# Patient Record
Sex: FEMALE | Race: WHITE | NOT HISPANIC OR LATINO | Employment: FULL TIME | URBAN - NONMETROPOLITAN AREA
[De-identification: names, ages, dates, MRNs, and addresses within clinical notes are randomized per-mention and may not be internally consistent; named-entity substitution may affect disease eponyms.]

---

## 2017-11-02 ENCOUNTER — OFFICE VISIT (OUTPATIENT)
Dept: FAMILY MEDICINE CLINIC | Facility: CLINIC | Age: 42
End: 2017-11-02

## 2017-11-02 VITALS
HEIGHT: 62 IN | WEIGHT: 149.2 LBS | TEMPERATURE: 97.5 F | SYSTOLIC BLOOD PRESSURE: 132 MMHG | HEART RATE: 96 BPM | DIASTOLIC BLOOD PRESSURE: 80 MMHG | OXYGEN SATURATION: 96 % | BODY MASS INDEX: 27.46 KG/M2

## 2017-11-02 DIAGNOSIS — M25.571 ACUTE RIGHT ANKLE PAIN: Primary | ICD-10-CM

## 2017-11-02 PROCEDURE — 99213 OFFICE O/P EST LOW 20 MIN: CPT | Performed by: FAMILY MEDICINE

## 2017-11-02 NOTE — PROGRESS NOTES
Brad Perez is a 42 y.o. female.     History of Present Illness     Sprain right ankle, pot hole trick or treating    Review of Systems   Constitutional: Negative for chills, fatigue and fever.   HENT: Negative for congestion, ear discharge, ear pain, facial swelling, hearing loss, postnasal drip, rhinorrhea, sinus pressure, sore throat, trouble swallowing and voice change.    Eyes: Negative for discharge, redness and visual disturbance.   Respiratory: Negative for cough, chest tightness, shortness of breath and wheezing.    Cardiovascular: Negative for chest pain and palpitations.   Gastrointestinal: Negative for abdominal pain, blood in stool, constipation, diarrhea, nausea and vomiting.   Endocrine: Negative for polydipsia and polyuria.   Genitourinary: Negative for dysuria, flank pain, hematuria and urgency.   Musculoskeletal: Negative for arthralgias, back pain, joint swelling and myalgias.   Skin: Negative for rash.   Neurological: Negative for dizziness, weakness, numbness and headaches.   Hematological: Negative for adenopathy.   Psychiatric/Behavioral: Negative for confusion and sleep disturbance. The patient is not nervous/anxious.        Objective   Physical Exam   Constitutional: She is oriented to person, place, and time. She appears well-developed and well-nourished.   HENT:   Head: Normocephalic and atraumatic.   Right Ear: External ear normal.   Left Ear: External ear normal.   Nose: Nose normal.   Eyes: Conjunctivae and EOM are normal. Pupils are equal, round, and reactive to light.   Neck: Normal range of motion.   Pulmonary/Chest: Effort normal.   Musculoskeletal: Normal range of motion.   Right lateral prox foot and lateral ankle.  Some mild bruising now greenish/yellow and minimal swelling   Neurological: She is alert and oriented to person, place, and time.   Psychiatric: She has a normal mood and affect. Her behavior is normal. Judgment and thought content normal.    Nursing note and vitals reviewed.      Assessment/Plan   Carol was seen today for ankle pain.    Diagnoses and all orders for this visit:    Acute right ankle pain  -     XR Ankle 3+ View Right  -     XR Foot 3+ View Right      I don't see fracture.   Just be careful  Offered splint.

## 2018-01-08 ENCOUNTER — OFFICE VISIT (OUTPATIENT)
Dept: FAMILY MEDICINE CLINIC | Facility: CLINIC | Age: 43
End: 2018-01-08

## 2018-01-08 VITALS
WEIGHT: 144.8 LBS | DIASTOLIC BLOOD PRESSURE: 80 MMHG | BODY MASS INDEX: 27.34 KG/M2 | OXYGEN SATURATION: 96 % | HEART RATE: 109 BPM | HEIGHT: 61 IN | SYSTOLIC BLOOD PRESSURE: 138 MMHG | TEMPERATURE: 97.6 F

## 2018-01-08 DIAGNOSIS — E11.8 TYPE 2 DIABETES MELLITUS WITH COMPLICATION, WITHOUT LONG-TERM CURRENT USE OF INSULIN (HCC): Primary | ICD-10-CM

## 2018-01-08 DIAGNOSIS — R73.9 HYPERGLYCEMIA: ICD-10-CM

## 2018-01-08 LAB
BILIRUB BLD-MCNC: NEGATIVE MG/DL
CLARITY, POC: ABNORMAL
COLOR UR: YELLOW
GLUCOSE BLDC GLUCOMTR-MCNC: 294 MG/DL (ref 70–130)
GLUCOSE UR STRIP-MCNC: ABNORMAL MG/DL
KETONES UR QL: ABNORMAL
LEUKOCYTE EST, POC: ABNORMAL
NITRITE UR-MCNC: NEGATIVE MG/ML
PH UR: 5 [PH] (ref 5–8)
PROT UR STRIP-MCNC: ABNORMAL MG/DL
RBC # UR STRIP: ABNORMAL /UL
SP GR UR: 1.01 (ref 1–1.03)
UROBILINOGEN UR QL: NORMAL

## 2018-01-08 PROCEDURE — 82962 GLUCOSE BLOOD TEST: CPT | Performed by: FAMILY MEDICINE

## 2018-01-08 PROCEDURE — 81002 URINALYSIS NONAUTO W/O SCOPE: CPT | Performed by: FAMILY MEDICINE

## 2018-01-08 PROCEDURE — 99204 OFFICE O/P NEW MOD 45 MIN: CPT | Performed by: FAMILY MEDICINE

## 2018-01-08 RX ORDER — INSULIN GLARGINE 100 [IU]/ML
10 INJECTION, SOLUTION SUBCUTANEOUS DAILY
Qty: 10 ML | Refills: 0 | Status: SHIPPED | OUTPATIENT
Start: 2018-01-08 | End: 2018-01-08

## 2018-01-08 NOTE — PROGRESS NOTES
Subjective   Carol Perez is a 42 y.o. female.     History of Present Illness     For 2 months, dizziness, cotton mouth, urinating frequently.  Last night fsbs 542.  In office 291 and glucose in urine  Both parents are diabetic and on metformin  Pmh: none  Psh: none  Sh:  No tobacco or etoh.  Worked as cna, ,   Fh:  Both parents diabetic    Review of Systems   Constitutional: Negative for chills, fatigue and fever.   HENT: Negative for congestion, ear discharge, ear pain, facial swelling, hearing loss, postnasal drip, rhinorrhea, sinus pressure, sore throat, trouble swallowing and voice change.    Eyes: Negative for discharge, redness and visual disturbance.   Respiratory: Negative for cough, chest tightness, shortness of breath and wheezing.    Cardiovascular: Negative for chest pain and palpitations.   Gastrointestinal: Negative for abdominal pain, blood in stool, constipation, diarrhea, nausea and vomiting.   Endocrine: Positive for polydipsia and polyuria.   Genitourinary: Positive for urgency. Negative for dysuria, flank pain and hematuria.   Musculoskeletal: Negative for arthralgias, back pain, joint swelling and myalgias.   Skin: Negative for rash.   Neurological: Positive for dizziness and headaches. Negative for weakness and numbness.   Hematological: Negative for adenopathy.   Psychiatric/Behavioral: Negative for confusion and sleep disturbance. The patient is not nervous/anxious.        Objective   Physical Exam   Constitutional: She is oriented to person, place, and time. She appears well-developed and well-nourished.   HENT:   Head: Normocephalic and atraumatic.   Right Ear: External ear normal.   Left Ear: External ear normal.   Nose: Nose normal.   Eyes: Conjunctivae and EOM are normal. Pupils are equal, round, and reactive to light.   Neck: Normal range of motion.   Pulmonary/Chest: Effort normal.   Musculoskeletal: Normal range of motion.   Neurological: She is alert and oriented  to person, place, and time.   Psychiatric: She has a normal mood and affect. Her behavior is normal. Judgment and thought content normal.   Nursing note and vitals reviewed.      Assessment/Plan   Carol was seen today for dizziness, dry mouth and urinary frequency.    Diagnoses and all orders for this visit:    Type 2 diabetes mellitus with complication, without long-term current use of insulin    Hyperglycemia  -     CBC (No Diff)  -     Comprehensive Metabolic Panel  -     Hemoglobin A1c  -     POC Urinalysis Dipstick  -     POC Glucose    Other orders  -     Discontinue: insulin glargine (LANTUS) 100 UNIT/ML injection; Inject 10 Units under the skin Daily. Then use per sliding scale  -     metFORMIN (GLUCOPHAGE) 1000 MG tablet; Take 1 tablet by mouth 2 (Two) Times a Day With Meals.      Start lantus per sliding scale to help glucose toxicity.  Give 10 units tonight.  Disregard note above to discontinue lantus.    Call me with fsbs tomorrow  Start metformin.  Return for bloodwork when less dehydrated  Must get diet sports drink and drink one alternating with water.  Discussed diet.   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    rx for blood glucose machine and test bid for now.

## 2018-01-09 ENCOUNTER — TELEPHONE (OUTPATIENT)
Dept: FAMILY MEDICINE CLINIC | Facility: CLINIC | Age: 43
End: 2018-01-09

## 2018-02-12 ENCOUNTER — OFFICE VISIT (OUTPATIENT)
Dept: FAMILY MEDICINE CLINIC | Facility: CLINIC | Age: 43
End: 2018-02-12

## 2018-02-12 VITALS
BODY MASS INDEX: 27.15 KG/M2 | TEMPERATURE: 98 F | HEART RATE: 111 BPM | SYSTOLIC BLOOD PRESSURE: 122 MMHG | WEIGHT: 143.8 LBS | HEIGHT: 61 IN | OXYGEN SATURATION: 97 % | DIASTOLIC BLOOD PRESSURE: 80 MMHG

## 2018-02-12 DIAGNOSIS — E11.8 TYPE 2 DIABETES MELLITUS WITH COMPLICATION, WITHOUT LONG-TERM CURRENT USE OF INSULIN (HCC): ICD-10-CM

## 2018-02-12 DIAGNOSIS — R35.0 URINARY FREQUENCY: Primary | ICD-10-CM

## 2018-02-12 DIAGNOSIS — R31.9 HEMATURIA, UNSPECIFIED TYPE: Primary | ICD-10-CM

## 2018-02-12 LAB
BILIRUB BLD-MCNC: NEGATIVE MG/DL
CLARITY, POC: ABNORMAL
COLOR UR: YELLOW
GLUCOSE UR STRIP-MCNC: ABNORMAL MG/DL
KETONES UR QL: NEGATIVE
LEUKOCYTE EST, POC: ABNORMAL
NITRITE UR-MCNC: NEGATIVE MG/ML
PH UR: 6 [PH] (ref 5–8)
PROT UR STRIP-MCNC: ABNORMAL MG/DL
RBC # UR STRIP: ABNORMAL /UL
SP GR UR: 1 (ref 1–1.03)
UROBILINOGEN UR QL: NORMAL

## 2018-02-12 PROCEDURE — 87186 SC STD MICRODIL/AGAR DIL: CPT | Performed by: FAMILY MEDICINE

## 2018-02-12 PROCEDURE — 87086 URINE CULTURE/COLONY COUNT: CPT | Performed by: FAMILY MEDICINE

## 2018-02-12 PROCEDURE — 99214 OFFICE O/P EST MOD 30 MIN: CPT | Performed by: FAMILY MEDICINE

## 2018-02-12 PROCEDURE — 81002 URINALYSIS NONAUTO W/O SCOPE: CPT | Performed by: FAMILY MEDICINE

## 2018-02-12 PROCEDURE — 87077 CULTURE AEROBIC IDENTIFY: CPT | Performed by: FAMILY MEDICINE

## 2018-02-12 RX ORDER — INSULIN GLARGINE 100 [IU]/ML
10 INJECTION, SOLUTION SUBCUTANEOUS NIGHTLY
COMMUNITY
End: 2018-02-12 | Stop reason: ALTCHOICE

## 2018-02-12 RX ORDER — FLUCONAZOLE 150 MG/1
TABLET ORAL
Qty: 2 TABLET | Refills: 0 | Status: SHIPPED | OUTPATIENT
Start: 2018-02-12 | End: 2018-03-14

## 2018-02-12 RX ORDER — SULFAMETHOXAZOLE AND TRIMETHOPRIM 800; 160 MG/1; MG/1
1 TABLET ORAL 2 TIMES DAILY
Qty: 14 TABLET | Refills: 0 | Status: SHIPPED | OUTPATIENT
Start: 2018-02-12 | End: 2018-03-14

## 2018-02-12 RX ORDER — INSULIN ASPART 100 [IU]/ML
INJECTION, SUSPENSION SUBCUTANEOUS
Qty: 30 ML | Refills: 0 | Status: SHIPPED | OUTPATIENT
Start: 2018-02-12 | End: 2019-08-28 | Stop reason: SDUPTHER

## 2018-02-12 NOTE — PROGRESS NOTES
Subjective   Carol Perez is a 42 y.o. female.     History of Present Illness     Remembers to use her insulin 4 days per week.  Just busy.  fsbs 290's in am.  Up to 38 units lantus at night  Has vaginal itching and pressure.   Drinking constantly 2 months.  Using insulin 1 month  Feels bladder pressure, like bladder is filling up as she is already urinating.    Review of Systems   Constitutional: Negative for chills, fatigue and fever.   HENT: Negative for congestion, ear discharge, ear pain, facial swelling, hearing loss, postnasal drip, rhinorrhea, sinus pressure, sore throat, trouble swallowing and voice change.    Eyes: Negative for discharge, redness and visual disturbance.   Respiratory: Negative for cough, chest tightness, shortness of breath and wheezing.    Cardiovascular: Negative for chest pain and palpitations.   Gastrointestinal: Negative for abdominal pain, blood in stool, constipation, diarrhea, nausea and vomiting.   Endocrine: Negative for polydipsia and polyuria.   Genitourinary: Positive for frequency. Negative for dysuria, flank pain, hematuria and urgency.   Musculoskeletal: Negative for arthralgias, back pain, joint swelling and myalgias.   Skin: Negative for rash.   Neurological: Negative for dizziness, weakness, numbness and headaches.   Hematological: Negative for adenopathy.   Psychiatric/Behavioral: Negative for confusion and sleep disturbance. The patient is not nervous/anxious.        Objective   Physical Exam   Constitutional: She is oriented to person, place, and time. She appears well-developed and well-nourished.   HENT:   Head: Normocephalic and atraumatic.   Right Ear: External ear normal.   Left Ear: External ear normal.   Nose: Nose normal.   Eyes: Conjunctivae and EOM are normal. Pupils are equal, round, and reactive to light.   Neck: Normal range of motion.   Pulmonary/Chest: Effort normal.   Musculoskeletal: Normal range of motion.   Neurological: She is alert and  oriented to person, place, and time.   Psychiatric: She has a normal mood and affect. Her behavior is normal. Judgment and thought content normal.   Nursing note and vitals reviewed.      Assessment/Plan   Carol was seen today for diabetes.    Diagnoses and all orders for this visit:    Urinary frequency    Type 2 diabetes mellitus with complication, without long-term current use of insulin    Other orders  -     insulin aspart prot-insulin aspart (NOVOLOG MIX 70/30) (70-30) 100 UNIT/ML injection; START 30 UNITS AC BREAKFAST AND 20  UNITS AC SUPPER. I EXPECT SHE WILL NEED 100  UNITS PER DAY.  -     fluconazole (DIFLUCAN) 150 MG tablet; ONE NOW AND ONE IN 3 DAYS.  -     sulfamethoxazole-trimethoprim (BACTRIM DS,SEPTRA DS) 800-160 MG per tablet; Take 1 tablet by mouth 2 (Two) Times a Day.    return weekly.  Start new insulin 70/30 30 units before breakfast and 20 units before supper.  Call if any problems    Covered for both bacterial and yeast    Drink water and no sugar Gatorade.    Explained nothing I can do if she is not compliant with therapy.

## 2018-02-15 LAB
BACTERIA SPEC AEROBE CULT: ABNORMAL
BACTERIA SPEC AEROBE CULT: ABNORMAL

## 2018-03-14 ENCOUNTER — OFFICE VISIT (OUTPATIENT)
Dept: ORTHOPEDIC SURGERY | Facility: CLINIC | Age: 43
End: 2018-03-14

## 2018-03-14 VITALS — BODY MASS INDEX: 27.05 KG/M2 | HEIGHT: 62 IN | WEIGHT: 147 LBS

## 2018-03-14 DIAGNOSIS — S93.401A SPRAIN OF RIGHT ANKLE, UNSPECIFIED LIGAMENT, INITIAL ENCOUNTER: ICD-10-CM

## 2018-03-14 DIAGNOSIS — G89.29 CHRONIC PAIN OF RIGHT ANKLE: Primary | ICD-10-CM

## 2018-03-14 DIAGNOSIS — M25.571 ACUTE RIGHT ANKLE PAIN: Primary | ICD-10-CM

## 2018-03-14 DIAGNOSIS — M25.571 CHRONIC PAIN OF RIGHT ANKLE: Primary | ICD-10-CM

## 2018-03-14 PROCEDURE — 99214 OFFICE O/P EST MOD 30 MIN: CPT | Performed by: NURSE PRACTITIONER

## 2018-03-14 RX ORDER — MELOXICAM 15 MG/1
15 TABLET ORAL DAILY
Qty: 30 TABLET | Refills: 1 | Status: SHIPPED | OUTPATIENT
Start: 2018-03-14 | End: 2018-04-13

## 2018-03-14 NOTE — PROGRESS NOTES
Carol Perez is a 42 y.o. female   Primary provider:  Kieran Valle MD       Chief Complaint   Patient presents with   • Right Ankle - Pain       HISTORY OF PRESENT ILLNESS:    Patient complains of right ankle pain/injury that occurred on 10/31/2017 when she stepped off in a pothole, inverting her right ankle.  Patient was seen by her primary care physician, Dr. Valle, on 11/2/2017 with x-rays done that were negative.  Patient continues to have right ankle pain that worsens with longer periods of weightbearing, standing and walking.  Patient continues to have intermittent swelling of the right ankle with longer periods of weightbearing. She has not tried any treatments for the ankle pain/injury other than intermittent rest of it.  Pain is described as intermittent and mild in severity.  Pain is described as aching and burning in nature with associated popping sensations.  Pain is worse with weightbearing, standing and walking.  Pain improves mildly with rest.      Pain   This is a new problem. The current episode started more than 1 month ago (10/31/2017). The problem occurs intermittently. The problem has been unchanged. Associated symptoms include joint swelling. Associated symptoms comments: Aching, burning, popping. . The symptoms are aggravated by walking and standing. She has tried rest for the symptoms. The treatment provided mild relief.        CONCURRENT MEDICAL HISTORY:    Past Medical History:   Diagnosis Date   • Encounter for insertion of intrauterine contraceptive device      replace mirena      • History of female sterilization    • IUD check up    • Pain in pelvis    • Surgical follow-up care        No Known Allergies      Current Outpatient Prescriptions:   •  insulin aspart prot-insulin aspart (NOVOLOG MIX 70/30) (70-30) 100 UNIT/ML injection, START 30 UNITS AC BREAKFAST AND 20  UNITS AC SUPPER. I EXPECT SHE WILL NEED 100  UNITS PER DAY., Disp: 30 mL, Rfl: 0  •  metFORMIN (GLUCOPHAGE)  "1000 MG tablet, Take 1 tablet by mouth 2 (Two) Times a Day With Meals., Disp: 60 tablet, Rfl: 0  •  meloxicam (MOBIC) 15 MG tablet, Take 1 tablet by mouth Daily for 30 days., Disp: 30 tablet, Rfl: 1    Past Surgical History:   Procedure Laterality Date   • HYSTEROSCOPY TUBAL STERILIZATION  10/22/2014    Essure tubal sterilization precedure.   • INJECTION OF MEDICATION  09/03/2014    Dep Provera   • PAP SMEAR  03/10/2008    neg   • VAGINAL DELIVERY  04/05/2008       Family History   Problem Relation Age of Onset   • Diabetes Mother    • Heart disease Mother    • Rheum arthritis Father    • Breast cancer Neg Hx    • Colon cancer Neg Hx    • Endometrial cancer Neg Hx    • Ovarian cancer Neg Hx        Social History     Social History   • Marital status:      Spouse name: N/A   • Number of children: N/A   • Years of education: N/A     Occupational History   • Not on file.     Social History Main Topics   • Smoking status: Never Smoker   • Smokeless tobacco: Never Used   • Alcohol use No   • Drug use: No   • Sexual activity: Defer     Other Topics Concern   • Not on file     Social History Narrative   • No narrative on file        Review of Systems   Constitutional: Negative.    HENT: Negative.    Eyes: Negative.    Respiratory: Negative.    Cardiovascular: Negative.    Gastrointestinal: Negative.    Endocrine: Negative.    Genitourinary: Negative.    Musculoskeletal: Positive for joint swelling.        Right ankle pain. Right foot pain.    Skin: Negative.    Allergic/Immunologic: Negative.    Neurological: Negative.    Hematological: Negative.    Psychiatric/Behavioral: Negative.        PHYSICAL EXAMINATION:       Ht 156.2 cm (61.5\")   Wt 66.7 kg (147 lb)   BMI 27.33 kg/m²     Physical Exam   Constitutional: She is oriented to person, place, and time. Vital signs are normal. She appears well-developed and well-nourished. She is active and cooperative. She does not appear ill. No distress.   HENT:   Head: " Normocephalic.   Pulmonary/Chest: Effort normal. No respiratory distress.   Abdominal: Soft. She exhibits no distension.   Musculoskeletal: She exhibits tenderness (Right ankle, right foot). She exhibits no edema or deformity.   Neurological: She is alert and oriented to person, place, and time. GCS eye subscore is 4. GCS verbal subscore is 5. GCS motor subscore is 6.   Skin: Skin is warm, dry and intact. Capillary refill takes less than 2 seconds.   Psychiatric: She has a normal mood and affect. Her speech is normal and behavior is normal. Judgment and thought content normal. Cognition and memory are normal.   Nursing note and vitals reviewed.      GAIT:     [x]  Normal  []  Antalgic    Assistive device: [x]  None  []  Walker     []  Crutches  []  Cane     []  Wheelchair  []  Stretcher    Right Ankle Exam   Swelling: none    Tenderness   Right ankle tenderness location: lateral, proximal foot and anterior ankle.        Range of Motion   Dorsiflexion: normal   Plantar flexion: normal   Inversion: abnormal   Eversion: abnormal     Muscle Strength   Dorsiflexion:  4/5  Plantar flexion:  4/5  Other   Erythema: absent  Sensation: normal  Pulse: present     Comments:  No swelling present today. No ecchymosis. No deformity. Mild pain with range of motion, primarily with inversion/eversion.       Left Ankle Exam   Swelling: none    Tenderness   The patient is experiencing no tenderness.     Range of Motion   The patient has normal left ankle ROM.     Muscle Strength   The patient has normal left ankle strength.    Other   Erythema: absent  Sensation: normal  Pulse: present            PROCEDURE:  Right  ankle 3 views     REASON FOR EXAM: fell in [facility], M25.571 Pain in right ankle and  joints of right foot     FINDINGS: . Bony and soft tissue structures of the ankle are  normal. No evidence of fracture-dislocation.        IMPRESSION:  1. Negative ankle.     Electronically signed by:  Smith Cardenas MD  11/3/2017 7:51 AM  CDT  Workstation: [id number]    Xr Ankle 3+ View Right    Result Date: 3/14/2018  Narrative: 3 views of the right ankle reveal no evidence of fracture.  Ankle mortise is intact.  No soft tissue swelling is appreciated.  There are no significant changes when compared with previous images from 11/2/2017.  No acute radiologic abnormalities are noted at this time.03/14/18 at 1:15 PM by TRACI Barnhart         ASSESSMENT:    Diagnoses and all orders for this visit:    Chronic pain of right ankle  -     MRI Ankle Right Without Contrast; Future    Sprain of right ankle, unspecified ligament, initial encounter  -     MRI Ankle Right Without Contrast; Future    Other orders  -     meloxicam (MOBIC) 15 MG tablet; Take 1 tablet by mouth Daily for 30 days.    PLAN    X-rays of right ankle reviewed and compared with previous images from 11/2/2017.  Patient has continued to have right ankle pain, especially with weightbearing, and intermittent swelling of the right ankle since her initial injury occurred on 10/31/2017.  Recommend MRI of right ankle to evaluate for tendon and/or ligament injury/rupture.  Patient has not worn any brace to the right ankle since the initial injury.  Recommend ASO ankle brace for support/stability.  Recommend modified activity and modified weight-bearing as tolerated and based on her pain.  Patient has continued to work as a CNA and not been able to modify her activity or weightbearing much since the time of the injury.  Patient does report increased pain and swelling with longer periods of weightbearing, standing and walking.  Recommend intermittent elevation of the right ankle above the heart to minimize swelling.  Recommend ice therapy intermittently as needed for pain/swelling.  Recommend consistent dosing of oral NSAIDs for pain.  Meloxicam is prescribed today for daily dosing.  Follow-up after MRI.    Return after MRI.      This document has been electronically signed by TRACI Barnhart on  March 14, 2018 1:46 PM      TRACI Barnhart

## 2018-03-20 ENCOUNTER — HOSPITAL ENCOUNTER (OUTPATIENT)
Dept: MRI IMAGING | Facility: HOSPITAL | Age: 43
Discharge: HOME | End: 2018-03-20
Admitting: NURSE PRACTITIONER

## 2018-03-20 DIAGNOSIS — S93.401A SPRAIN OF RIGHT ANKLE, UNSPECIFIED LIGAMENT, INITIAL ENCOUNTER: ICD-10-CM

## 2018-03-20 DIAGNOSIS — M25.571 CHRONIC PAIN OF RIGHT ANKLE: ICD-10-CM

## 2018-03-20 DIAGNOSIS — G89.29 CHRONIC PAIN OF RIGHT ANKLE: ICD-10-CM

## 2018-03-20 PROCEDURE — 73721 MRI JNT OF LWR EXTRE W/O DYE: CPT

## 2018-03-22 ENCOUNTER — OFFICE VISIT (OUTPATIENT)
Dept: ORTHOPEDIC SURGERY | Facility: CLINIC | Age: 43
End: 2018-03-22

## 2018-03-22 VITALS — BODY MASS INDEX: 27.42 KG/M2 | HEIGHT: 62 IN | WEIGHT: 149 LBS

## 2018-03-22 DIAGNOSIS — G89.29 CHRONIC PAIN OF RIGHT ANKLE: Primary | ICD-10-CM

## 2018-03-22 DIAGNOSIS — S93.401D SPRAIN OF RIGHT ANKLE, UNSPECIFIED LIGAMENT, SUBSEQUENT ENCOUNTER: ICD-10-CM

## 2018-03-22 DIAGNOSIS — M25.571 CHRONIC PAIN OF RIGHT ANKLE: Primary | ICD-10-CM

## 2018-03-22 PROCEDURE — 99213 OFFICE O/P EST LOW 20 MIN: CPT | Performed by: NURSE PRACTITIONER

## 2018-03-22 NOTE — PROGRESS NOTES
Carol Perez is a 42 y.o. female returns for     Chief Complaint   Patient presents with   • Right Ankle - Follow-up   • Results     mri right ankle       HISTORY OF PRESENT ILLNESS: Patient presents to office for follow up of right ankle pain and MRI results of right ankle. Initial injury occurred on 10/31/2017 when she stepped off in a pothole, inverting her ankle. Patient continues to have chronic right ankle pain that worsens with longer periods of standing and walking. Patient was fitted with an ankle brace at last office visit to wear for support with weightbearing. She is not wearing it in office today and states she doesn't think it helps much. Patient was also prescribed Meloxicam. Patient states this is helping her pain some.      CONCURRENT MEDICAL HISTORY:    Past Medical History:   Diagnosis Date   • Encounter for insertion of intrauterine contraceptive device      replace mirena      • History of female sterilization    • IUD check up    • Pain in pelvis    • Surgical follow-up care        No Known Allergies      Current Outpatient Prescriptions:   •  insulin aspart prot-insulin aspart (NOVOLOG MIX 70/30) (70-30) 100 UNIT/ML injection, START 30 UNITS AC BREAKFAST AND 20  UNITS AC SUPPER. I EXPECT SHE WILL NEED 100  UNITS PER DAY., Disp: 30 mL, Rfl: 0  •  meloxicam (MOBIC) 15 MG tablet, Take 1 tablet by mouth Daily for 30 days., Disp: 30 tablet, Rfl: 1  •  metFORMIN (GLUCOPHAGE) 1000 MG tablet, Take 1 tablet by mouth 2 (Two) Times a Day With Meals., Disp: 60 tablet, Rfl: 0    Past Surgical History:   Procedure Laterality Date   • HYSTEROSCOPY TUBAL STERILIZATION  10/22/2014    Essure tubal sterilization precedure.   • INJECTION OF MEDICATION  09/03/2014    Dep Provera   • PAP SMEAR  03/10/2008    neg   • VAGINAL DELIVERY  04/05/2008       ROS  No fevers or chills.  No chest pain or shortness of air.  No GI or  disturbances. Right ankle pain.     PHYSICAL EXAMINATION:       Ht 156.2 cm  "(61.5\")   Wt 67.6 kg (149 lb)   BMI 27.70 kg/m²     Physical Exam   Constitutional: She is oriented to person, place, and time. Vital signs are normal. She appears well-developed and well-nourished. She is active. She does not appear ill. No distress.   HENT:   Head: Normocephalic.   Pulmonary/Chest: Effort normal. No respiratory distress.   Abdominal: Soft. She exhibits no distension.   Neurological: She is alert and oriented to person, place, and time. GCS eye subscore is 4. GCS verbal subscore is 5. GCS motor subscore is 6.   Skin: Skin is warm, dry and intact. Capillary refill takes less than 2 seconds.   Psychiatric: She has a normal mood and affect. Her speech is normal and behavior is normal. Judgment and thought content normal. Cognition and memory are normal.   Vitals reviewed.      GAIT:     []  Normal  [x]  Antalgic    Assistive device: [x]  None  []  Walker     []  Crutches  []  Cane     []  Wheelchair  []  Stretcher    Right Ankle Exam   Swelling: none    Tenderness   Right ankle tenderness location: lateral, proximal foot and anterior ankle.        Range of Motion   Dorsiflexion: normal   Plantar flexion: normal   Inversion: abnormal   Eversion: abnormal     Muscle Strength   Dorsiflexion:  4/5  Plantar flexion:  4/5  Other   Erythema: absent  Sensation: normal  Pulse: present     Comments:  No swelling. No ecchymosis. No deformity. Mild pain with range of motion, primarily with inversion/eversion.       Left Ankle Exam   Swelling: none    Tenderness   The patient is experiencing no tenderness.     Range of Motion   The patient has normal left ankle ROM.     Other   Erythema: absent  Sensation: normal  Pulse: present            Xr Ankle 3+ View Right    Result Date: 3/14/2018  Narrative: 3 views of the right ankle reveal no evidence of fracture.  Ankle mortise is intact.  No soft tissue swelling is appreciated.  There are no significant changes when compared with previous images from 11/2/2017.  No " acute radiologic abnormalities are noted at this time.03/14/18 at 1:15 PM by TRACI Barnhart     Mri Ankle Right Without Contrast    Result Date: 3/20/2018  Narrative: [facility] [place] Patient Name: BUD LANCASTER ORDERING: NATALIYA SEXTON ATTENDING: REFERRING: NATALIYA SEXTON ----------------------- PROCEDURE: MR right ankle without contrast INDICATION: Pain and swelling, injury, suspect ligament injury/rupture TECHNIQUE: Multiplanar and multisequential MR images of the right ankle were obtained without IV contrast. COMPARISON: Radiographs 11/2/2017 FINDINGS: Ligaments: The anterior and posterior talofibular, anterior and posterior tibiofibular, calcaneofibular, deltoid and visualized Lisfranc ligaments, are intact. Tendons:  The Achilles, flexor, extensor and peroneal tendons are normal in signal and morphology. Osseous structures:  Normal bone marrow signal intensity. No fracture or dislocation.  No osteochondral defect evident. Additional findings:  No joint effusion.  The fat within the sinus tarsi is preserved. The tarsal tunnel and plantar fascia are unremarkable.     Impression: Normal exam. No ligamentous injury evident. Electronically signed by:  Morena Henriquez MD  3/20/2018 8:10 PM CDT Workstation: [id number]        ASSESSMENT:    Diagnoses and all orders for this visit:    Chronic pain of right ankle  -     Ambulatory Referral to Physical Therapy Evaluate and treat    Sprain of right ankle, unspecified ligament, subsequent encounter  -     Ambulatory Referral to Physical Therapy Evaluate and treat    PLAN    MRI of right ankle reviewed and results discussed with patient. Patient was fitted with an ASO ankle brace at last visit but she states it is not helping her much and she is not wearing it today. Encouraged patient to wear the brace, especially with longer periods of weight-bearing, standing and walking, such as when she is at work. Recommend activity modification as tolerated and  based on her pain. Recommend elevation of the right ankle intermittently as needed for swelling. She has no swelling today. Recommend alternating ice and heat therapy as needed for swelling and pain. Recommend physical therapy for strengthening and conditioning of the right ankle. Patient is in agreement with this and wants to do therapy at [facility]. Recommend to continue Meloxicam daily, as previously prescribed. Follow up in 6 weeks for recheck.     Return in about 6 weeks (around 5/3/2018).      This document has been electronically signed by TRACI Barnhart on March 22, 2018 2:14 PM      TRACI Barnhart

## 2019-04-08 ENCOUNTER — TELEPHONE (OUTPATIENT)
Dept: FAMILY MEDICINE CLINIC | Facility: CLINIC | Age: 44
End: 2019-04-08

## 2019-04-08 RX ORDER — FLUCONAZOLE 150 MG/1
TABLET ORAL
Qty: 2 TABLET | Refills: 0 | Status: SHIPPED | OUTPATIENT
Start: 2019-04-08 | End: 2019-09-10

## 2019-08-28 ENCOUNTER — APPOINTMENT (OUTPATIENT)
Dept: LAB | Facility: HOSPITAL | Age: 44
End: 2019-08-28

## 2019-08-28 ENCOUNTER — OFFICE VISIT (OUTPATIENT)
Dept: FAMILY MEDICINE CLINIC | Facility: CLINIC | Age: 44
End: 2019-08-28

## 2019-08-28 VITALS
DIASTOLIC BLOOD PRESSURE: 64 MMHG | HEIGHT: 61 IN | SYSTOLIC BLOOD PRESSURE: 104 MMHG | OXYGEN SATURATION: 100 % | WEIGHT: 134.6 LBS | TEMPERATURE: 98.1 F | BODY MASS INDEX: 25.41 KG/M2 | HEART RATE: 94 BPM

## 2019-08-28 DIAGNOSIS — Z91.199 MEDICAL NON-COMPLIANCE: ICD-10-CM

## 2019-08-28 DIAGNOSIS — E11.41 DIABETIC MONONEUROPATHY ASSOCIATED WITH TYPE 2 DIABETES MELLITUS (HCC): ICD-10-CM

## 2019-08-28 DIAGNOSIS — E11.8 TYPE 2 DIABETES MELLITUS WITH COMPLICATION, WITHOUT LONG-TERM CURRENT USE OF INSULIN (HCC): Primary | ICD-10-CM

## 2019-08-28 PROCEDURE — 80061 LIPID PANEL: CPT | Performed by: FAMILY MEDICINE

## 2019-08-28 PROCEDURE — 80053 COMPREHEN METABOLIC PANEL: CPT | Performed by: FAMILY MEDICINE

## 2019-08-28 PROCEDURE — 99213 OFFICE O/P EST LOW 20 MIN: CPT | Performed by: FAMILY MEDICINE

## 2019-08-28 PROCEDURE — 83721 ASSAY OF BLOOD LIPOPROTEIN: CPT | Performed by: FAMILY MEDICINE

## 2019-08-28 PROCEDURE — 83036 HEMOGLOBIN GLYCOSYLATED A1C: CPT | Performed by: FAMILY MEDICINE

## 2019-08-28 PROCEDURE — 85027 COMPLETE CBC AUTOMATED: CPT | Performed by: FAMILY MEDICINE

## 2019-08-28 PROCEDURE — 84443 ASSAY THYROID STIM HORMONE: CPT | Performed by: FAMILY MEDICINE

## 2019-08-28 RX ORDER — LANCETS 28 GAUGE
1 EACH MISCELLANEOUS 2 TIMES DAILY PRN
Qty: 100 EACH | Refills: 12 | Status: SHIPPED | OUTPATIENT
Start: 2019-08-28 | End: 2020-05-05 | Stop reason: SDUPTHER

## 2019-08-28 RX ORDER — INSULIN ASPART 100 [IU]/ML
INJECTION, SUSPENSION SUBCUTANEOUS
Qty: 30 ML | Refills: 0 | Status: SHIPPED | OUTPATIENT
Start: 2019-08-28 | End: 2019-09-10 | Stop reason: SDUPTHER

## 2019-08-28 RX ORDER — METFORMIN HYDROCHLORIDE 500 MG/1
1000 TABLET, EXTENDED RELEASE ORAL
Qty: 60 TABLET | Refills: 0 | Status: SHIPPED | OUTPATIENT
Start: 2019-08-28 | End: 2019-09-10 | Stop reason: SDUPTHER

## 2019-08-28 NOTE — PROGRESS NOTES
" Subjective   Carol Perez is a 44 y.o. female.     History of Present Illness     Leg cramps  Over a year since seen for diabetes.  She says hasn't had time to see me, its not financial. Hasn't been testing her fsbs.   Still on 30u 70/30 am and 20u 70/30 pm  Metformin caused gi problems, stopped taking.    [place] [employer]  Burning toe up right leg.     Review of Systems   Constitutional: Negative for chills, fatigue and fever.   HENT: Negative for congestion, ear discharge, ear pain, facial swelling, hearing loss, postnasal drip, rhinorrhea, sinus pressure, sore throat, trouble swallowing and voice change.    Eyes: Negative for discharge, redness and visual disturbance.   Respiratory: Negative for cough, chest tightness, shortness of breath and wheezing.    Cardiovascular: Negative for chest pain and palpitations.   Gastrointestinal: Negative for abdominal pain, blood in stool, constipation, diarrhea, nausea and vomiting.   Endocrine: Negative for polydipsia and polyuria.   Genitourinary: Negative for dysuria, flank pain, hematuria and urgency.   Musculoskeletal: Negative for arthralgias, back pain, joint swelling and myalgias.   Skin: Negative for rash.   Neurological: Negative for dizziness, weakness, numbness and headaches.   Hematological: Negative for adenopathy.   Psychiatric/Behavioral: Negative for confusion and sleep disturbance. The patient is not nervous/anxious.            /64 (BP Location: Left arm, Patient Position: Sitting, Cuff Size: Adult)   Pulse 94   Temp 98.1 °F (36.7 °C) (Temporal)   Ht 156.2 cm (61.5\")   Wt 61.1 kg (134 lb 9.6 oz)   SpO2 100%   BMI 25.02 kg/m²       Objective     Physical Exam   Constitutional: She is oriented to person, place, and time. She appears well-developed and well-nourished.   HENT:   Head: Normocephalic and atraumatic.   Right Ear: External ear normal.   Left Ear: External ear normal.   Nose: Nose normal.   Eyes: Conjunctivae and EOM are " normal. Pupils are equal, round, and reactive to light.   Neck: Normal range of motion.   Pulmonary/Chest: Effort normal.   Musculoskeletal: Normal range of motion.   Mild discomfort both greater trochanter bursa areas  Mild discomfort si both si joints.    Neurological: She is alert and oriented to person, place, and time.   Psychiatric: She has a normal mood and affect. Her behavior is normal. Judgment and thought content normal.   Nursing note and vitals reviewed.          PAST MEDICAL HISTORY     Past Medical History:   Diagnosis Date   • Encounter for insertion of intrauterine contraceptive device      replace mirena      • History of female sterilization    • IUD check up    • Pain in pelvis    • Surgical follow-up care       PAST SURGICAL HISTORY     Past Surgical History:   Procedure Laterality Date   • HYSTEROSCOPY TUBAL STERILIZATION  10/22/2014    Essure tubal sterilization precedure.   • INJECTION OF MEDICATION  09/03/2014    Dep Provera   • PAP SMEAR  03/10/2008    neg   • VAGINAL DELIVERY  04/05/2008      SOCIAL HISTORY     Social History     Socioeconomic History   • Marital status:      Spouse name: Not on file   • Number of children: Not on file   • Years of education: Not on file   • Highest education level: Not on file   Tobacco Use   • Smoking status: Never Smoker   • Smokeless tobacco: Never Used   Substance and Sexual Activity   • Alcohol use: No   • Drug use: No   • Sexual activity: Defer      ALLERGIES   Patient has no known allergies.   MEDICATIONS     Current Outpatient Medications   Medication Sig Dispense Refill   • fluconazole (DIFLUCAN) 150 MG tablet One now and one in 3 days. 2 tablet 0   • glucose blood (FREESTYLE TEST STRIPS) test strip 1 each by Other route 2 (Two) Times a Day As Needed (sugar). 100 each 12   • insulin aspart prot-insulin aspart (NOVOLOG MIX 70/30) (70-30) 100 UNIT/ML injection START 30 UNITS AC BREAKFAST AND 20  UNITS AC SUPPER. I EXPECT SHE WILL NEED 100   UNITS PER DAY. 30 mL 0   • Lancets (FREESTYLE) lancets 1 each by Other route 2 (Two) Times a Day As Needed (sugar). 100 each 12   • metFORMIN ER (GLUCOPHAGE-XR) 500 MG 24 hr tablet Take 2 tablets by mouth Daily With Breakfast. 60 tablet 0     No current facility-administered medications for this visit.         The following portions of the patient's history were reviewed and updated as appropriate: allergies, current medications, past family history, past medical history, past social history, past surgical history and problem list.        Assessment/Plan   Carol was seen today for spasms.    Diagnoses and all orders for this visit:    Type 2 diabetes mellitus with complication, without long-term current use of insulin (CMS/[facility])  -     CBC (No Diff)  -     Comprehensive Metabolic Panel  -     TSH  -     Hemoglobin A1c  -     Lipid Panel    Diabetic mononeuropathy associated with type 2 diabetes mellitus (CMS/[facility])    Other orders  -     insulin aspart prot-insulin aspart (NOVOLOG MIX 70/30) (70-30) 100 UNIT/ML injection; START 30 UNITS AC BREAKFAST AND 20  UNITS AC SUPPER. I EXPECT SHE WILL NEED 100  UNITS PER DAY.  -     metFORMIN ER (GLUCOPHAGE-XR) 500 MG 24 hr tablet; Take 2 tablets by mouth Daily With Breakfast.  -     glucose blood (FREESTYLE TEST STRIPS) test strip; 1 each by Other route 2 (Two) Times a Day As Needed (sugar).  -     Lancets (FREESTYLE) lancets; 1 each by Other route 2 (Two) Times a Day As Needed (sugar).        Try metformin er  Return 1 week to go over labs.   Explained neuropathy, didn't explain reason for leg cramps.               No Follow-up on file.                  This document has been electronically signed by Kieran Valle MD on August 28, 2019 4:55 PM

## 2019-08-29 ENCOUNTER — TELEPHONE (OUTPATIENT)
Dept: FAMILY MEDICINE CLINIC | Facility: CLINIC | Age: 44
End: 2019-08-29

## 2019-08-29 LAB
ALBUMIN SERPL-MCNC: 4.7 G/DL (ref 3.5–5.2)
ALBUMIN/GLOB SERPL: 2 G/DL
ALP SERPL-CCNC: 100 U/L (ref 39–117)
ALT SERPL W P-5'-P-CCNC: 61 U/L (ref 1–33)
ANION GAP SERPL CALCULATED.3IONS-SCNC: 12.7 MMOL/L (ref 5–15)
ARTICHOKE IGE QN: 217 MG/DL (ref 0–100)
AST SERPL-CCNC: 54 U/L (ref 1–32)
BILIRUB SERPL-MCNC: <0.2 MG/DL (ref 0.2–1.2)
BUN BLD-MCNC: 7 MG/DL (ref 6–20)
BUN/CREAT SERPL: 10 (ref 7–25)
CALCIUM SPEC-SCNC: 9.3 MG/DL (ref 8.6–10.5)
CHLORIDE SERPL-SCNC: 100 MMOL/L (ref 98–107)
CHOLEST SERPL-MCNC: 274 MG/DL (ref 0–200)
CO2 SERPL-SCNC: 22.3 MMOL/L (ref 22–29)
CREAT BLD-MCNC: 0.7 MG/DL (ref 0.57–1)
DEPRECATED RDW RBC AUTO: 42.9 FL (ref 37–54)
ERYTHROCYTE [DISTWIDTH] IN BLOOD BY AUTOMATED COUNT: 14.1 % (ref 12.3–15.4)
GFR SERPL CREATININE-BSD FRML MDRD: 91 ML/MIN/1.73
GLOBULIN UR ELPH-MCNC: 2.4 GM/DL
GLUCOSE BLD-MCNC: 315 MG/DL (ref 65–99)
HBA1C MFR BLD: 13.27 % (ref 4.8–5.6)
HCT VFR BLD AUTO: 42.9 % (ref 34–46.6)
HDLC SERPL-MCNC: 37 MG/DL (ref 40–60)
HGB BLD-MCNC: 13.7 G/DL (ref 12–15.9)
LDLC SERPL CALC-MCNC: ABNORMAL MG/DL
LDLC/HDLC SERPL: ABNORMAL {RATIO}
MCH RBC QN AUTO: 26.7 PG (ref 26.6–33)
MCHC RBC AUTO-ENTMCNC: 31.9 G/DL (ref 31.5–35.7)
MCV RBC AUTO: 83.5 FL (ref 79–97)
PLATELET # BLD AUTO: 295 10*3/MM3 (ref 140–450)
PMV BLD AUTO: 12.3 FL (ref 6–12)
POTASSIUM BLD-SCNC: 4.2 MMOL/L (ref 3.5–5.2)
PROT SERPL-MCNC: 7.1 G/DL (ref 6–8.5)
RBC # BLD AUTO: 5.14 10*6/MM3 (ref 3.77–5.28)
SODIUM BLD-SCNC: 135 MMOL/L (ref 136–145)
TRIGL SERPL-MCNC: 413 MG/DL (ref 0–150)
TSH SERPL DL<=0.05 MIU/L-ACNC: 2.64 UIU/ML (ref 0.27–4.2)
VLDLC SERPL-MCNC: ABNORMAL MG/DL
WBC NRBC COR # BLD: 4.99 10*3/MM3 (ref 3.4–10.8)

## 2019-08-29 RX ORDER — AMITRIPTYLINE HYDROCHLORIDE 10 MG/1
10-30 TABLET, FILM COATED ORAL NIGHTLY
Qty: 90 TABLET | Refills: 11 | OUTPATIENT
Start: 2019-08-29 | End: 2020-10-19

## 2019-08-30 DIAGNOSIS — R74.8 ELEVATED LIVER ENZYMES: Primary | ICD-10-CM

## 2019-09-10 ENCOUNTER — OFFICE VISIT (OUTPATIENT)
Dept: FAMILY MEDICINE CLINIC | Facility: CLINIC | Age: 44
End: 2019-09-10

## 2019-09-10 VITALS
WEIGHT: 139 LBS | DIASTOLIC BLOOD PRESSURE: 58 MMHG | SYSTOLIC BLOOD PRESSURE: 96 MMHG | OXYGEN SATURATION: 96 % | BODY MASS INDEX: 26.24 KG/M2 | HEIGHT: 61 IN | TEMPERATURE: 97.7 F | HEART RATE: 84 BPM

## 2019-09-10 DIAGNOSIS — Z79.4 TYPE 2 DIABETES MELLITUS WITH HYPERGLYCEMIA, WITH LONG-TERM CURRENT USE OF INSULIN (HCC): Primary | ICD-10-CM

## 2019-09-10 DIAGNOSIS — Z12.31 SCREENING MAMMOGRAM, ENCOUNTER FOR: ICD-10-CM

## 2019-09-10 DIAGNOSIS — E11.65 TYPE 2 DIABETES MELLITUS WITH HYPERGLYCEMIA, WITH LONG-TERM CURRENT USE OF INSULIN (HCC): Primary | ICD-10-CM

## 2019-09-10 PROCEDURE — 99213 OFFICE O/P EST LOW 20 MIN: CPT | Performed by: FAMILY MEDICINE

## 2019-09-10 RX ORDER — INSULIN ASPART 100 [IU]/ML
INJECTION, SUSPENSION SUBCUTANEOUS
Qty: 30 ML | Refills: 2 | Status: SHIPPED | OUTPATIENT
Start: 2019-09-10 | End: 2020-04-29 | Stop reason: SDUPTHER

## 2019-09-10 RX ORDER — METFORMIN HYDROCHLORIDE 500 MG/1
1000 TABLET, EXTENDED RELEASE ORAL
Qty: 60 TABLET | Refills: 2 | Status: SHIPPED | OUTPATIENT
Start: 2019-09-10 | End: 2020-07-29

## 2019-09-10 NOTE — PROGRESS NOTES
" Subjective   Carol Perez is a 44 y.o. female.     History of Present Illness     Follow up diabetes.  hga1c 13.27      Review of Systems   Constitutional: Negative for chills, fatigue and fever.   HENT: Negative for congestion, ear discharge, ear pain, facial swelling, hearing loss, postnasal drip, rhinorrhea, sinus pressure, sore throat, trouble swallowing and voice change.    Eyes: Negative for discharge, redness and visual disturbance.   Respiratory: Negative for cough, chest tightness, shortness of breath and wheezing.    Cardiovascular: Negative for chest pain and palpitations.   Gastrointestinal: Negative for abdominal pain, blood in stool, constipation, diarrhea, nausea and vomiting.   Endocrine: Negative for polydipsia and polyuria.   Genitourinary: Negative for dysuria, flank pain, hematuria and urgency.   Musculoskeletal: Negative for arthralgias, back pain, joint swelling and myalgias.   Skin: Negative for rash.   Neurological: Negative for dizziness, weakness, numbness and headaches.   Hematological: Negative for adenopathy.   Psychiatric/Behavioral: Negative for confusion and sleep disturbance. The patient is not nervous/anxious.            BP 96/58 (BP Location: Left arm, Patient Position: Sitting, Cuff Size: Adult)   Pulse 84   Temp 97.7 °F (36.5 °C) (Temporal)   Ht 156.2 cm (61.5\")   Wt 63 kg (139 lb)   SpO2 96%   BMI 25.84 kg/m²       Objective     Physical Exam   Constitutional: She is oriented to person, place, and time. She appears well-developed and well-nourished.   HENT:   Head: Normocephalic and atraumatic.   Right Ear: External ear normal.   Left Ear: External ear normal.   Nose: Nose normal.   moist   Eyes: Conjunctivae and EOM are normal. Pupils are equal, round, and reactive to light.   Neck: Normal range of motion.   Pulmonary/Chest: Effort normal.   Musculoskeletal: Normal range of motion.   Neurological: She is alert and oriented to person, place, and time. "   Psychiatric: She has a normal mood and affect. Her behavior is normal. Judgment and thought content normal.   Nursing note and vitals reviewed.          PAST MEDICAL HISTORY     Past Medical History:   Diagnosis Date   • Encounter for insertion of intrauterine contraceptive device      replace mirena      • History of female sterilization    • IUD check up    • Pain in pelvis    • Surgical follow-up care       PAST SURGICAL HISTORY     Past Surgical History:   Procedure Laterality Date   • HYSTEROSCOPY TUBAL STERILIZATION  10/22/2014    Essure tubal sterilization precedure.   • INJECTION OF MEDICATION  09/03/2014    Dep Provera   • PAP SMEAR  03/10/2008    neg   • VAGINAL DELIVERY  04/05/2008      SOCIAL HISTORY     Social History     Socioeconomic History   • Marital status:      Spouse name: Not on file   • Number of children: Not on file   • Years of education: Not on file   • Highest education level: Not on file   Tobacco Use   • Smoking status: Never Smoker   • Smokeless tobacco: Never Used   Substance and Sexual Activity   • Alcohol use: No   • Drug use: No   • Sexual activity: Defer      ALLERGIES   Patient has no known allergies.   MEDICATIONS     Current Outpatient Medications   Medication Sig Dispense Refill   • amitriptyline (ELAVIL) 10 MG tablet Take 1-3 tablets by mouth Every Night. 90 tablet 11   • glucose blood (FREESTYLE TEST STRIPS) test strip 1 each by Other route 2 (Two) Times a Day As Needed (sugar). 100 each 12   • insulin aspart prot-insulin aspart (NOVOLOG MIX 70/30) (70-30) 100 UNIT/ML injection START 30 UNITS AC BREAKFAST AND 20  UNITS AC SUPPER. I EXPECT SHE WILL NEED 100  UNITS PER DAY. 30 mL 2   • Lancets (FREESTYLE) lancets 1 each by Other route 2 (Two) Times a Day As Needed (sugar). 100 each 12   • metFORMIN ER (GLUCOPHAGE-XR) 500 MG 24 hr tablet Take 2 tablets by mouth Daily With Breakfast. 60 tablet 2     No current facility-administered medications for this visit.          The following portions of the patient's history were reviewed and updated as appropriate: allergies, current medications, past family history, past medical history, past social history, past surgical history and problem list.        Assessment/Plan   Carol was seen today for diabetes and follow-up.    Diagnoses and all orders for this visit:    Type 2 diabetes mellitus with hyperglycemia, with long-term current use of insulin (CMS/[facility])    Screening mammogram, encounter for  -     Mammo Screening Digital Tomosynthesis Bilateral With CAD    Other orders  -     metFORMIN ER (GLUCOPHAGE-XR) 500 MG 24 hr tablet; Take 2 tablets by mouth Daily With Breakfast.  -     insulin aspart prot-insulin aspart (NOVOLOG MIX 70/30) (70-30) 100 UNIT/ML injection; START 30 UNITS AC BREAKFAST AND 20  UNITS AC SUPPER. I EXPECT SHE WILL NEED 100  UNITS PER DAY.      25 min talk face to face again regarding natural history diabetes, diet, exercise.  Start 30 u am and 20 u pm 70/30 but start increasing am insulin dose by 2 units daily until pm sugar is less than 180, then come back and see me.                  No Follow-up on file.                  This document has been electronically signed by Kieran Valle MD on September 10, 2019 1:04 PM

## 2019-12-23 ENCOUNTER — OFFICE VISIT (OUTPATIENT)
Dept: FAMILY MEDICINE CLINIC | Facility: CLINIC | Age: 44
End: 2019-12-23

## 2019-12-23 VITALS
TEMPERATURE: 96.9 F | OXYGEN SATURATION: 99 % | HEIGHT: 61 IN | SYSTOLIC BLOOD PRESSURE: 110 MMHG | WEIGHT: 138.6 LBS | BODY MASS INDEX: 26.17 KG/M2 | HEART RATE: 93 BPM | DIASTOLIC BLOOD PRESSURE: 68 MMHG

## 2019-12-23 DIAGNOSIS — R30.0 DYSURIA: Primary | ICD-10-CM

## 2019-12-23 LAB
BILIRUB BLD-MCNC: NEGATIVE MG/DL
CLARITY, POC: ABNORMAL
COLOR UR: ABNORMAL
GLUCOSE UR STRIP-MCNC: ABNORMAL MG/DL
KETONES UR QL: NEGATIVE
LEUKOCYTE EST, POC: ABNORMAL
NITRITE UR-MCNC: NEGATIVE MG/ML
PH UR: 6 [PH] (ref 5–8)
PROT UR STRIP-MCNC: ABNORMAL MG/DL
RBC # UR STRIP: ABNORMAL /UL
SP GR UR: 1.03 (ref 1–1.03)
UROBILINOGEN UR QL: NORMAL

## 2019-12-23 PROCEDURE — 81002 URINALYSIS NONAUTO W/O SCOPE: CPT | Performed by: FAMILY MEDICINE

## 2019-12-23 PROCEDURE — 99213 OFFICE O/P EST LOW 20 MIN: CPT | Performed by: FAMILY MEDICINE

## 2019-12-23 RX ORDER — PHENAZOPYRIDINE HYDROCHLORIDE 100 MG/1
100 TABLET, FILM COATED ORAL EVERY 4 HOURS PRN
Qty: 12 TABLET | Refills: 0 | Status: SHIPPED | OUTPATIENT
Start: 2019-12-23 | End: 2020-06-16

## 2019-12-23 NOTE — PROGRESS NOTES
" Brad Perez is a 44 y.o. female.     History of Present Illness     This am, have to urinate badly and it burns.  Hasn't drank or eaten since 10pm last night.   She is on her period  Denies vaginal discharge or odor.    Review of Systems   Constitutional: Negative for chills, fatigue and fever.   HENT: Negative for congestion, ear discharge, ear pain, facial swelling, hearing loss, postnasal drip, rhinorrhea, sinus pressure, sore throat, trouble swallowing and voice change.    Eyes: Negative for discharge, redness and visual disturbance.   Respiratory: Negative for cough, chest tightness, shortness of breath and wheezing.    Cardiovascular: Negative for chest pain and palpitations.   Gastrointestinal: Negative for abdominal pain, blood in stool, constipation, diarrhea, nausea and vomiting.   Endocrine: Negative for polydipsia and polyuria.   Genitourinary: Positive for dysuria and urgency. Negative for flank pain and hematuria.   Musculoskeletal: Negative for arthralgias, back pain, joint swelling and myalgias.   Skin: Negative for rash.   Neurological: Negative for dizziness, weakness, numbness and headaches.   Hematological: Negative for adenopathy.   Psychiatric/Behavioral: Negative for confusion and sleep disturbance. The patient is not nervous/anxious.            /68   Pulse 93   Temp 96.9 °F (36.1 °C) (Temporal)   Ht 154.9 cm (61\")   Wt 62.9 kg (138 lb 9.6 oz)   LMP 12/19/2019 (Approximate)   SpO2 99%   BMI 26.19 kg/m²       Objective     Physical Exam   Constitutional: She is oriented to person, place, and time. She appears well-developed and well-nourished.   HENT:   Head: Normocephalic and atraumatic.   Right Ear: External ear normal.   Left Ear: External ear normal.   Nose: Nose normal.   Eyes: Pupils are equal, round, and reactive to light. Conjunctivae and EOM are normal.   Neck: Normal range of motion.   Pulmonary/Chest: Effort normal.   Musculoskeletal: Normal " range of motion.   Neurological: She is alert and oriented to person, place, and time.   Psychiatric: She has a normal mood and affect. Her behavior is normal. Judgment and thought content normal.   Nursing note and vitals reviewed.          PAST MEDICAL HISTORY     Past Medical History:   Diagnosis Date   • Encounter for insertion of intrauterine contraceptive device      replace mirena      • History of female sterilization    • IUD check up    • Pain in pelvis    • Surgical follow-up care       PAST SURGICAL HISTORY     Past Surgical History:   Procedure Laterality Date   • HYSTEROSCOPY TUBAL STERILIZATION  10/22/2014    Essure tubal sterilization precedure.   • INJECTION OF MEDICATION  09/03/2014    Dep Provera   • PAP SMEAR  03/10/2008    neg   • VAGINAL DELIVERY  04/05/2008      SOCIAL HISTORY     Social History     Socioeconomic History   • Marital status:      Spouse name: Not on file   • Number of children: Not on file   • Years of education: Not on file   • Highest education level: Not on file   Tobacco Use   • Smoking status: Never Smoker   • Smokeless tobacco: Never Used   Substance and Sexual Activity   • Alcohol use: No   • Drug use: No   • Sexual activity: Defer      ALLERGIES   Patient has no known allergies.   MEDICATIONS     Current Outpatient Medications   Medication Sig Dispense Refill   • amitriptyline (ELAVIL) 10 MG tablet Take 1-3 tablets by mouth Every Night. 90 tablet 11   • glucose blood (FREESTYLE TEST STRIPS) test strip 1 each by Other route 2 (Two) Times a Day As Needed (sugar). 100 each 12   • insulin aspart prot-insulin aspart (NOVOLOG MIX 70/30) (70-30) 100 UNIT/ML injection START 30 UNITS AC BREAKFAST AND 20  UNITS AC SUPPER. I EXPECT SHE WILL NEED 100  UNITS PER DAY. 30 mL 2   • Lancets (FREESTYLE) lancets 1 each by Other route 2 (Two) Times a Day As Needed (sugar). 100 each 12   • metFORMIN ER (GLUCOPHAGE-XR) 500 MG 24 hr tablet Take 2 tablets by mouth Daily With Breakfast.  60 tablet 2   • phenazopyridine (PYRIDIUM) 100 MG tablet Take 1 tablet by mouth Every 4 (Four) Hours As Needed for Bladder Spasms. 12 tablet 0     No current facility-administered medications for this visit.         The following portions of the patient's history were reviewed and updated as appropriate: allergies, current medications, past family history, past medical history, past social history, past surgical history and problem list.        Assessment/Plan   Carol was seen today for urinary tract infection.    Diagnoses and all orders for this visit:    Dysuria  -     POCT urinalysis dipstick, manual    Other orders  -     phenazopyridine (PYRIDIUM) 100 MG tablet; Take 1 tablet by mouth Every 4 (Four) Hours As Needed for Bladder Spasms.    urine:  sg 1.030, 3+ blood, 1+ protein, carmella esterase 2+    Drink water until urine is clear.                    No follow-ups on file.                  This document has been electronically signed by Kieran Valle MD on December 23, 2019 2:31 PM

## 2020-04-15 ENCOUNTER — OFFICE VISIT (OUTPATIENT)
Dept: FAMILY MEDICINE CLINIC | Facility: CLINIC | Age: 45
End: 2020-04-15

## 2020-04-15 VITALS
OXYGEN SATURATION: 98 % | BODY MASS INDEX: 26.51 KG/M2 | HEIGHT: 61 IN | WEIGHT: 140.4 LBS | DIASTOLIC BLOOD PRESSURE: 64 MMHG | HEART RATE: 90 BPM | TEMPERATURE: 98 F | SYSTOLIC BLOOD PRESSURE: 100 MMHG

## 2020-04-15 DIAGNOSIS — J02.9 SORE THROAT: ICD-10-CM

## 2020-04-15 DIAGNOSIS — H69.83 DYSFUNCTION OF BOTH EUSTACHIAN TUBES: ICD-10-CM

## 2020-04-15 DIAGNOSIS — J30.2 SEASONAL ALLERGIC RHINITIS, UNSPECIFIED TRIGGER: Primary | ICD-10-CM

## 2020-04-15 PROCEDURE — 99213 OFFICE O/P EST LOW 20 MIN: CPT | Performed by: FAMILY MEDICINE

## 2020-04-15 RX ORDER — FLUTICASONE PROPIONATE 50 MCG
2 SPRAY, SUSPENSION (ML) NASAL DAILY
Qty: 1 BOTTLE | Refills: 11 | Status: SHIPPED | OUTPATIENT
Start: 2020-04-15 | End: 2020-07-29

## 2020-04-15 RX ORDER — METHYLPREDNISOLONE 4 MG/1
TABLET ORAL
Qty: 21 TABLET | Refills: 0 | Status: SHIPPED | OUTPATIENT
Start: 2020-04-15 | End: 2020-06-16

## 2020-04-15 RX ORDER — CETIRIZINE HYDROCHLORIDE 10 MG/1
10 TABLET ORAL DAILY
Qty: 30 TABLET | Refills: 11 | OUTPATIENT
Start: 2020-04-15 | End: 2020-10-19

## 2020-04-15 RX ORDER — PSEUDOEPHEDRINE HCL 30 MG
30 TABLET ORAL EVERY 4 HOURS PRN
Qty: 30 TABLET | Refills: 0 | OUTPATIENT
Start: 2020-04-15 | End: 2020-10-19

## 2020-04-15 NOTE — PROGRESS NOTES
" Brad Perez is a 44 y.o. female.     History of Present Illness     Sore throat and ear ache for 2 days.  Worried has ear infection    Review of Systems   Constitutional: Negative for chills, fatigue and fever.   HENT: Positive for ear pain and sore throat. Negative for congestion, ear discharge, facial swelling, hearing loss, postnasal drip, rhinorrhea, sinus pressure, trouble swallowing and voice change.    Eyes: Negative for discharge, redness and visual disturbance.   Respiratory: Negative for cough, chest tightness, shortness of breath and wheezing.    Cardiovascular: Negative for chest pain and palpitations.   Gastrointestinal: Negative for abdominal pain, blood in stool, constipation, diarrhea, nausea and vomiting.   Endocrine: Negative for polydipsia and polyuria.   Genitourinary: Negative for dysuria, flank pain, hematuria and urgency.   Musculoskeletal: Negative for arthralgias, back pain, joint swelling and myalgias.   Skin: Negative for rash.   Neurological: Negative for dizziness, weakness, numbness and headaches.   Hematological: Negative for adenopathy.   Psychiatric/Behavioral: Negative for confusion and sleep disturbance. The patient is not nervous/anxious.            /64 (BP Location: Left arm, Patient Position: Sitting, Cuff Size: Adult)   Pulse 90   Temp 98 °F (36.7 °C) (Temporal)   Ht 154.9 cm (60.98\")   Wt 63.7 kg (140 lb 6.4 oz)   SpO2 98%   BMI 26.54 kg/m²       Objective     Physical Exam   Constitutional: She is oriented to person, place, and time. She appears well-developed and well-nourished.   HENT:   Head: Normocephalic and atraumatic.   Right Ear: External ear normal.   Left Ear: External ear normal.   Nose: Nose normal.   Mouth/Throat: Oropharynx is clear and moist.   Eyes: Pupils are equal, round, and reactive to light. Conjunctivae and EOM are normal.   Neck: Normal range of motion. Neck supple.   Cardiovascular: Normal rate, regular rhythm and " normal heart sounds. Exam reveals no gallop and no friction rub.   No murmur heard.  Pulmonary/Chest: Effort normal and breath sounds normal.   Abdominal: Soft. Bowel sounds are normal. She exhibits no distension. There is no tenderness. There is no rebound and no guarding.   Musculoskeletal: Normal range of motion. She exhibits no edema or deformity.   Neurological: She is alert and oriented to person, place, and time. No cranial nerve deficit.   Skin: Skin is warm and dry. No rash noted. No erythema.   Psychiatric: She has a normal mood and affect. Her behavior is normal. Judgment and thought content normal.   Nursing note and vitals reviewed.          PAST MEDICAL HISTORY     Past Medical History:   Diagnosis Date   • Encounter for insertion of intrauterine contraceptive device      replace mirena      • History of female sterilization    • IUD check up    • Pain in pelvis    • Surgical follow-up care       PAST SURGICAL HISTORY     Past Surgical History:   Procedure Laterality Date   • HYSTEROSCOPY TUBAL STERILIZATION  10/22/2014    Essure tubal sterilization precedure.   • INJECTION OF MEDICATION  09/03/2014    Dep Provera   • PAP SMEAR  03/10/2008    neg   • VAGINAL DELIVERY  04/05/2008      SOCIAL HISTORY     Social History     Socioeconomic History   • Marital status:      Spouse name: Not on file   • Number of children: Not on file   • Years of education: Not on file   • Highest education level: Not on file   Tobacco Use   • Smoking status: Never Smoker   • Smokeless tobacco: Never Used   Substance and Sexual Activity   • Alcohol use: No   • Drug use: No   • Sexual activity: Defer      ALLERGIES   Patient has no known allergies.   MEDICATIONS     Current Outpatient Medications   Medication Sig Dispense Refill   • amitriptyline (ELAVIL) 10 MG tablet Take 1-3 tablets by mouth Every Night. 90 tablet 11   • glucose blood (FREESTYLE TEST STRIPS) test strip 1 each by Other route 2 (Two) Times a Day As  Needed (sugar). 100 each 12   • insulin aspart prot-insulin aspart (NOVOLOG MIX 70/30) (70-30) 100 UNIT/ML injection START 30 UNITS AC BREAKFAST AND 20  UNITS AC SUPPER. I EXPECT SHE WILL NEED 100  UNITS PER DAY. 30 mL 2   • Lancets (FREESTYLE) lancets 1 each by Other route 2 (Two) Times a Day As Needed (sugar). 100 each 12   • metFORMIN ER (GLUCOPHAGE-XR) 500 MG 24 hr tablet Take 2 tablets by mouth Daily With Breakfast. 60 tablet 2   • cetirizine (zyrTEC) 10 MG tablet Take 1 tablet by mouth Daily. 30 tablet 11   • fluticasone (Flonase) 50 MCG/ACT nasal spray 2 sprays into the nostril(s) as directed by provider Daily. 1 bottle 11   • methylPREDNISolone (MEDROL, CHANDLER,) 4 MG tablet Take as directed on package instructions. 21 tablet 0   • phenazopyridine (PYRIDIUM) 100 MG tablet Take 1 tablet by mouth Every 4 (Four) Hours As Needed for Bladder Spasms. 12 tablet 0   • pseudoephedrine (Sudafed) 30 MG tablet Take 1 tablet by mouth Every 4 (Four) Hours As Needed for Congestion. 30 tablet 0     No current facility-administered medications for this visit.         The following portions of the patient's history were reviewed and updated as appropriate: allergies, current medications, past family history, past medical history, past social history, past surgical history and problem list.        Assessment/Plan   Carol was seen today for sore throat, earache and headache.    Diagnoses and all orders for this visit:    Seasonal allergic rhinitis, unspecified trigger    Dysfunction of both eustachian tubes    Sore throat    Other orders  -     methylPREDNISolone (MEDROL, CHANDLER,) 4 MG tablet; Take as directed on package instructions.  -     pseudoephedrine (Sudafed) 30 MG tablet; Take 1 tablet by mouth Every 4 (Four) Hours As Needed for Congestion.  -     fluticasone (Flonase) 50 MCG/ACT nasal spray; 2 sprays into the nostril(s) as directed by provider Daily.  -     cetirizine (zyrTEC) 10 MG tablet; Take 1 tablet by mouth  Daily.    reassurance, no ear infection.                    No follow-ups on file.                  This document has been electronically signed by Kieran Valle MD on April 15, 2020 13:26

## 2020-04-29 RX ORDER — FLUCONAZOLE 150 MG/1
TABLET ORAL
Qty: 2 TABLET | Refills: 0 | Status: SHIPPED | OUTPATIENT
Start: 2020-04-29 | End: 2020-06-16

## 2020-04-29 RX ORDER — INSULIN ASPART 100 [IU]/ML
INJECTION, SUSPENSION SUBCUTANEOUS
Qty: 30 ML | Refills: 0 | Status: SHIPPED | OUTPATIENT
Start: 2020-04-29 | End: 2020-05-05 | Stop reason: ALTCHOICE

## 2020-04-29 NOTE — TELEPHONE ENCOUNTER
"PT CALLED REQUESTING A REFILL ON  - insulin aspart prot-insulin aspart (NOVOLOG MIX 70/30) (70-30) 100 UNIT/ML injection    PT ALSO STATED SHE NEEDS HER YEAST INFECTION MEDICATION. PT STATED SHE DOESN'T KNOW THE NAME OF THE MEDICATION BUT KNOWS SHE TAKES IT \"THREE TIMES\".    PHARMACY: [facility] [address] [facility] [phone] [facility] [phone]   [phone]    PT'S CALLBACK NUMBER: [phone]  "

## 2020-04-30 ENCOUNTER — TELEPHONE (OUTPATIENT)
Dept: FAMILY MEDICINE CLINIC | Facility: CLINIC | Age: 45
End: 2020-04-30

## 2020-04-30 NOTE — TELEPHONE ENCOUNTER
Patient called in to advise that new prescription needs a prior authorization before she can pick it up. Please contact.

## 2020-05-01 NOTE — TELEPHONE ENCOUNTER
Patient called to check on her PA status for her Insulin. Please call patient back once it has been approved. Phone number is [phone]

## 2020-05-05 ENCOUNTER — OFFICE VISIT (OUTPATIENT)
Dept: FAMILY MEDICINE CLINIC | Facility: CLINIC | Age: 45
End: 2020-05-05

## 2020-05-05 VITALS
HEART RATE: 94 BPM | BODY MASS INDEX: 25.79 KG/M2 | TEMPERATURE: 98.6 F | DIASTOLIC BLOOD PRESSURE: 68 MMHG | SYSTOLIC BLOOD PRESSURE: 100 MMHG | WEIGHT: 136.6 LBS | OXYGEN SATURATION: 98 % | HEIGHT: 61 IN

## 2020-05-05 DIAGNOSIS — E11.65 TYPE 2 DIABETES MELLITUS WITH HYPERGLYCEMIA, WITH LONG-TERM CURRENT USE OF INSULIN (HCC): Primary | ICD-10-CM

## 2020-05-05 DIAGNOSIS — Z79.4 TYPE 2 DIABETES MELLITUS WITH HYPERGLYCEMIA, WITH LONG-TERM CURRENT USE OF INSULIN (HCC): Primary | ICD-10-CM

## 2020-05-05 PROCEDURE — 99213 OFFICE O/P EST LOW 20 MIN: CPT | Performed by: FAMILY MEDICINE

## 2020-05-05 RX ORDER — NAPROXEN SODIUM 220 MG
1 TABLET ORAL 2 TIMES DAILY
Qty: 100 EACH | Refills: 11 | Status: SHIPPED | OUTPATIENT
Start: 2020-05-05

## 2020-05-05 RX ORDER — LANCETS 28 GAUGE
1 EACH MISCELLANEOUS 2 TIMES DAILY PRN
Qty: 100 EACH | Refills: 12 | Status: SHIPPED | OUTPATIENT
Start: 2020-05-05

## 2020-05-05 NOTE — PROGRESS NOTES
" Subjective   Carol Perez is a 44 y.o. female.     History of Present Illness     After refill request for insulin, we found out from pharmacy she has not gotten refilled since 9/2019.   Her last hga1c was 13.27 8/28/2019  She just takes every now and then when she feels it is high.  Currently taking 50 units  When she gives herself a shot and it is in the pm when she does so.  The out of pocket cost to her for insulin is over $800 with her insurance.   Her mother is diabetic  Patient has 4 kids and a grand child.   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  She says this is why she cant take time to manage her diabetes.     Review of Systems   Constitutional: Negative for chills, fatigue and fever.   HENT: Negative for congestion, ear discharge, ear pain, facial swelling, hearing loss, postnasal drip, rhinorrhea, sinus pressure, sore throat, trouble swallowing and voice change.    Eyes: Negative for discharge, redness and visual disturbance.   Respiratory: Negative for cough, chest tightness, shortness of breath and wheezing.    Cardiovascular: Negative for chest pain and palpitations.   Gastrointestinal: Negative for abdominal pain, blood in stool, constipation, diarrhea, nausea and vomiting.   Endocrine: Negative for polydipsia and polyuria.   Genitourinary: Negative for dysuria, flank pain, hematuria and urgency.   Musculoskeletal: Negative for arthralgias, back pain, joint swelling and myalgias.   Skin: Negative for rash.   Neurological: Negative for dizziness, weakness, numbness and headaches.   Hematological: Negative for adenopathy.   Psychiatric/Behavioral: Negative for confusion and sleep disturbance. The patient is not nervous/anxious.            /68 (BP Location: Left arm, Patient Position: Sitting, Cuff Size: Adult)   Pulse 94   Temp 98.6 °F (37 °C) (Temporal)   Ht 154.9 cm (60.98\")   Wt 62 kg (136 lb 9.6 oz)   SpO2 98%   BMI 25.82 kg/m²       Objective "     Physical Exam   Constitutional: She is oriented to person, place, and time. She appears well-developed and well-nourished.   HENT:   Head: Normocephalic and atraumatic.   Right Ear: External ear normal.   Left Ear: External ear normal.   Nose: Nose normal.   Eyes: Pupils are equal, round, and reactive to light. Conjunctivae and EOM are normal.   Neck: Normal range of motion.   Pulmonary/Chest: Effort normal.   Musculoskeletal: Normal range of motion.   Neurological: She is alert and oriented to person, place, and time.   Psychiatric: She has a normal mood and affect. Her behavior is normal. Judgment and thought content normal.   Nursing note and vitals reviewed.          PAST MEDICAL HISTORY     Past Medical History:   Diagnosis Date   • Encounter for insertion of intrauterine contraceptive device      replace mirena      • History of female sterilization    • IUD check up    • Pain in pelvis    • Surgical follow-up care       PAST SURGICAL HISTORY     Past Surgical History:   Procedure Laterality Date   • HYSTEROSCOPY TUBAL STERILIZATION  10/22/2014    Essure tubal sterilization precedure.   • INJECTION OF MEDICATION  09/03/2014    Dep Provera   • PAP SMEAR  03/10/2008    neg   • VAGINAL DELIVERY  04/05/2008      SOCIAL HISTORY     Social History     Socioeconomic History   • Marital status:      Spouse name: Not on file   • Number of children: Not on file   • Years of education: Not on file   • Highest education level: Not on file   Tobacco Use   • Smoking status: Never Smoker   • Smokeless tobacco: Never Used   Substance and Sexual Activity   • Alcohol use: No   • Drug use: No   • Sexual activity: Defer      ALLERGIES   Patient has no known allergies.   MEDICATIONS     Current Outpatient Medications   Medication Sig Dispense Refill   • amitriptyline (ELAVIL) 10 MG tablet Take 1-3 tablets by mouth Every Night. 90 tablet 11   • cetirizine (zyrTEC) 10 MG tablet Take 1 tablet by mouth Daily. 30 tablet 11  "  • fluticasone (Flonase) 50 MCG/ACT nasal spray 2 sprays into the nostril(s) as directed by provider Daily. 1 bottle 11   • glucose blood (FREESTYLE TEST STRIPS) test strip 1 each by Other route 2 (Two) Times a Day As Needed (sugar). 100 each 12   • Lancets (FREESTYLE) lancets 1 each by Other route 2 (Two) Times a Day As Needed (sugar). 100 each 12   • metFORMIN ER (GLUCOPHAGE-XR) 500 MG 24 hr tablet Take 2 tablets by mouth Daily With Breakfast. 60 tablet 2   • pseudoephedrine (Sudafed) 30 MG tablet Take 1 tablet by mouth Every 4 (Four) Hours As Needed for Congestion. 30 tablet 0   • fluconazole (Diflucan) 150 MG tablet One now and in 3 days . 2 tablet 0   • insulin NPH-insulin regular (NovoLIN 70/30) (70-30) 100 UNIT/ML injection Start 30u ac breakfast and 20u ac supper. 20 mL 0   • Insulin Syringe 30G X 5/16\" 1 ML misc 1 each 2 (Two) Times a Day. 100 each 11   • methylPREDNISolone (MEDROL, CHANDLER,) 4 MG tablet Take as directed on package instructions. 21 tablet 0   • phenazopyridine (PYRIDIUM) 100 MG tablet Take 1 tablet by mouth Every 4 (Four) Hours As Needed for Bladder Spasms. 12 tablet 0     No current facility-administered medications for this visit.         The following portions of the patient's history were reviewed and updated as appropriate: allergies, current medications, past family history, past medical history, past social history, past surgical history and problem list.        Assessment/Plan   Carol was seen today for diabetes and med refill.    Diagnoses and all orders for this visit:    Type 2 diabetes mellitus with hyperglycemia, with long-term current use of insulin (CMS/[facility])  -     Ambulatory Referral to Endocrinology    Other orders  -     insulin NPH-insulin regular (NovoLIN 70/30) (70-30) 100 UNIT/ML injection; Start 30u ac breakfast and 20u ac supper.  -     Insulin Syringe 30G X 5/16\" 1 ML misc; 1 each 2 (Two) Times a Day.  -     glucose blood (FREESTYLE TEST STRIPS) test strip; 1 each " by Other route 2 (Two) Times a Day As Needed (sugar).  -     Lancets (FREESTYLE) lancets; 1 each by Other route 2 (Two) Times a Day As Needed (sugar).    I did not recheck hga1c.  im sure not good.    I explained difference between novolin and novolog insulin.  Take novolin 30 min before breakfast and supper not right before or after.     Start with 30u am and 20u pm    Wrist down fsbs bid and bring paper in one week for me to review    I suspect will need endocrinologist help.  Will put in referral.     Strongly encouraged to take care of herself, she is shortening her life allowing diabetes to be so out of control.                 No follow-ups on file.                  This document has been electronically signed by Kieran Valle MD on May 5, 2020 14:28

## 2020-06-16 ENCOUNTER — OFFICE VISIT (OUTPATIENT)
Dept: FAMILY MEDICINE CLINIC | Facility: CLINIC | Age: 45
End: 2020-06-16

## 2020-06-16 VITALS — TEMPERATURE: 98 F

## 2020-06-16 DIAGNOSIS — R53.81 MALAISE: Primary | ICD-10-CM

## 2020-06-16 PROCEDURE — 99212 OFFICE O/P EST SF 10 MIN: CPT | Performed by: FAMILY MEDICINE

## 2020-06-16 NOTE — PROGRESS NOTES
Subjective   Carol Perez is a 45 y.o. female.     History of Present Illness     Telephone note  Last Thursday, felt bad and a temp of 99  Her  did go to work due to being in contact with someone with symptoms of covid.  She says she feels just fine now? Just wondered if anything needed to be done.     Review of Systems   Constitutional:        Malaise on Thursday only           Temp 98 °F (36.7 °C)       Objective     Physical Exam        PAST MEDICAL HISTORY     Past Medical History:   Diagnosis Date   • Encounter for insertion of intrauterine contraceptive device      replace mirena      • History of female sterilization    • IUD check up    • Pain in pelvis    • Surgical follow-up care       PAST SURGICAL HISTORY     Past Surgical History:   Procedure Laterality Date   • HYSTEROSCOPY TUBAL STERILIZATION  10/22/2014    Essure tubal sterilization precedure.   • INJECTION OF MEDICATION  09/03/2014    Dep Provera   • PAP SMEAR  03/10/2008    neg   • VAGINAL DELIVERY  04/05/2008      SOCIAL HISTORY     Social History     Socioeconomic History   • Marital status:      Spouse name: Not on file   • Number of children: Not on file   • Years of education: Not on file   • Highest education level: Not on file   Tobacco Use   • Smoking status: Never Smoker   • Smokeless tobacco: Never Used   Substance and Sexual Activity   • Alcohol use: No   • Drug use: No   • Sexual activity: Defer      ALLERGIES   Patient has no known allergies.   MEDICATIONS     Current Outpatient Medications   Medication Sig Dispense Refill   • amitriptyline (ELAVIL) 10 MG tablet Take 1-3 tablets by mouth Every Night. 90 tablet 11   • cetirizine (zyrTEC) 10 MG tablet Take 1 tablet by mouth Daily. 30 tablet 11   • fluticasone (Flonase) 50 MCG/ACT nasal spray 2 sprays into the nostril(s) as directed by provider Daily. 1 bottle 11   • glucose blood (FREESTYLE TEST STRIPS) test strip 1 each by Other route 2 (Two) Times a Day As  "Needed (sugar). 100 each 12   • insulin NPH-insulin regular (NovoLIN 70/30) (70-30) 100 UNIT/ML injection Start 30u ac breakfast and 20u ac supper. 20 mL 0   • Insulin Syringe 30G X 5/16\" 1 ML misc 1 each 2 (Two) Times a Day. 100 each 11   • Lancets (FREESTYLE) lancets 1 each by Other route 2 (Two) Times a Day As Needed (sugar). 100 each 12   • metFORMIN ER (GLUCOPHAGE-XR) 500 MG 24 hr tablet Take 2 tablets by mouth Daily With Breakfast. 60 tablet 2   • pseudoephedrine (Sudafed) 30 MG tablet Take 1 tablet by mouth Every 4 (Four) Hours As Needed for Congestion. 30 tablet 0     No current facility-administered medications for this visit.         The following portions of the patient's history were reviewed and updated as appropriate: allergies, current medications, past family history, past medical history, past social history, past surgical history and problem list.        Assessment/Plan   Carol was seen today for chills and fever.    Diagnoses and all orders for this visit:    Malaise    reassurance, she is fine.   can go to work    This visit has been rescheduled as a phone visit to comply with patient safety concerns in accordance with CDC recommendations. Total time of discussion was 10 minutes.                   No follow-ups on file.                  This document has been electronically signed by Kieran Valle MD on June 16, 2020 14:43     "

## 2020-07-29 ENCOUNTER — TELEPHONE (OUTPATIENT)
Dept: ENDOCRINOLOGY | Facility: CLINIC | Age: 45
End: 2020-07-29

## 2020-07-29 ENCOUNTER — OFFICE VISIT (OUTPATIENT)
Dept: ENDOCRINOLOGY | Facility: CLINIC | Age: 45
End: 2020-07-29

## 2020-07-29 VITALS
HEART RATE: 78 BPM | WEIGHT: 135.8 LBS | OXYGEN SATURATION: 98 % | SYSTOLIC BLOOD PRESSURE: 100 MMHG | DIASTOLIC BLOOD PRESSURE: 62 MMHG | BODY MASS INDEX: 25.64 KG/M2 | HEIGHT: 61 IN

## 2020-07-29 DIAGNOSIS — E11.42 DIABETIC POLYNEUROPATHY ASSOCIATED WITH TYPE 2 DIABETES MELLITUS (HCC): ICD-10-CM

## 2020-07-29 DIAGNOSIS — E11.8 TYPE 2 DIABETES MELLITUS WITH COMPLICATION, WITHOUT LONG-TERM CURRENT USE OF INSULIN (HCC): Primary | ICD-10-CM

## 2020-07-29 LAB — HBA1C MFR BLD: 14 %

## 2020-07-29 PROCEDURE — 99214 OFFICE O/P EST MOD 30 MIN: CPT | Performed by: NURSE PRACTITIONER

## 2020-07-29 PROCEDURE — 83036 HEMOGLOBIN GLYCOSYLATED A1C: CPT | Performed by: NURSE PRACTITIONER

## 2020-07-29 NOTE — TELEPHONE ENCOUNTER
Madeleine das - called in new insulin  6 up to 16 un. With meals three times a day.   5pens / 11 refills  They will have to order this.

## 2020-07-29 NOTE — PROGRESS NOTES
Subjective    HPI     IN OFFICE VISIT       Referring provider Kieran Valle MD     45 year old female presents for consultation     Reason - diabetes mellitus type 2 not controlled     Duration/Timing -diagnosed in 2017/ constant       Quality -not controlled       Severity - high due to hyperglycemia       Severity (Complication/Hospitalizations)        Secondary Macrovascular--- no CAD, no PAD, no CVA        Secondary Microvascular-- neuropathy , no diabetic retinopathy, no renal disease       Context--presented with symptoms    She did have gestational diabetes          Diabetes Regimen--insulin           Lab Results   Component Value Date    HGBA1C 14 07/29/2020         Blood Glucose Readings    Checking 2 times daily     At nighttime -- high     During the day 396       Diet-   Regular     Associated Signs/Symptoms       Hyperglycemic Symptoms: increased thirst, urination        Hypoglycemic Episodes: none      Review of Systems  Review of Systems   Constitutional: Negative for activity change, appetite change, chills, diaphoresis, fatigue, unexpected weight gain and unexpected weight loss.   HENT: Negative for congestion, dental problem, drooling, ear discharge, sore throat, swollen glands, tinnitus, trouble swallowing and voice change.    Eyes: Negative for blurred vision, double vision, photophobia, pain, discharge, redness, itching and visual disturbance.   Respiratory: Negative for apnea, cough, choking, chest tightness and shortness of breath.    Cardiovascular: Negative for chest pain, palpitations and leg swelling.   Gastrointestinal: Negative for abdominal distention, abdominal pain, blood in stool, constipation, diarrhea, nausea and vomiting.   Endocrine: Negative for cold intolerance, heat intolerance, polydipsia, polyphagia and polyuria.   Genitourinary: Negative for decreased libido, decreased urine volume, difficulty urinating, dysuria, frequency and urgency.   Musculoskeletal: Negative for  arthralgias, back pain, gait problem, joint swelling, myalgias, neck pain, neck stiffness and bursitis.   Skin: Negative for color change, dry skin, pallor, rash and bruise.   Allergic/Immunologic: Negative for environmental allergies, food allergies and immunocompromised state.   Neurological: Negative for dizziness, tremors, syncope, facial asymmetry, weakness, numbness, memory problem and confusion.   Hematological: Negative for adenopathy. Does not bruise/bleed easily.   Psychiatric/Behavioral: Negative for agitation, behavioral problems, decreased concentration, sleep disturbance, suicidal ideas, depressed mood and stress. The patient is not nervous/anxious.      Wt Readings from Last 3 Encounters:   07/29/20 61.6 kg (135 lb 12.8 oz)   05/05/20 62 kg (136 lb 9.6 oz)   04/15/20 63.7 kg (140 lb 6.4 oz)     Temp Readings from Last 3 Encounters:   06/16/20 98 °F (36.7 °C)   05/05/20 98.6 °F (37 °C) (Temporal)   04/15/20 98 °F (36.7 °C) (Temporal)     BP Readings from Last 3 Encounters:   07/29/20 100/62   05/05/20 100/68   04/15/20 100/64     Pulse Readings from Last 3 Encounters:   07/29/20 78   05/05/20 94   04/15/20 90       Physical Exam  Physical Exam   Constitutional: She is oriented to person, place, and time. She appears well-developed and well-nourished. No distress.   HENT:   Head: Normocephalic and atraumatic.   Right Ear: External ear normal.   Left Ear: External ear normal.   Nose: Nose normal.   Eyes: Pupils are equal, round, and reactive to light. Conjunctivae and EOM are normal.   Neck: Normal range of motion. Neck supple. No tracheal deviation present. No thyromegaly present.   Cardiovascular: Normal rate, regular rhythm and normal heart sounds.   No murmur heard.  Pulmonary/Chest: Effort normal and breath sounds normal. No respiratory distress. She has no wheezes.   Abdominal: Soft. Bowel sounds are normal. There is no tenderness. There is no rebound and no guarding.   Musculoskeletal: Normal  range of motion. She exhibits no edema, tenderness or deformity.   Neurological: She is alert and oriented to person, place, and time. No cranial nerve deficit.   Skin: Skin is warm and dry. No rash noted.   Psychiatric: She has a normal mood and affect. Her behavior is normal. Judgment and thought content normal.         Assessment/Plan    ICD-10-CM ICD-9-CM   1. Type 2 diabetes mellitus with complication, without long-term current use of insulin (CMS/[facility]) E11.8 250.90   2. Diabetic polyneuropathy associated with type 2 diabetes mellitus (CMS/[facility]) E11.42 250.60     357.2          Glycemic Management    Diabetes mellitus type 2       Lab Results   Component Value Date    HGBA1C 14 07/29/2020       Metformin stopped due to side effects       Novolin 70/30 units -- taking 55 units at night -she is not taking the daytime dosage--- stop today and change to the following     New regimen :       Start Toujeo 24 units once daily --long acting     If you wake up with a blood glucose level less than 80 decrease by 4 units           Start Lyumjev --short actiing mealtime insulin       Give 6 units  before small meal    Give 8 units before large meal     Samples given and RX called in     Goals for sugar    Fasting and before meals 80 to 130    2 hours after meals 180 or less      Aim for 45 grams of carbohydrate per meal    Aim for 15 grams of carbohydrate per snack                 Lipid Management      Lab Results   Component Value Date    CHOL 274 (H) 08/28/2019     Lab Results   Component Value Date    TRIG 413 (H) 08/28/2019     Lab Results   Component Value Date    HDL 37 (L) 08/28/2019     Lab Results   Component Value Date    LDL  08/28/2019      Comment:      Unable to calculate     (H) 08/28/2019     Lab Results   Component Value Date    VLDL  08/28/2019      Comment:      Unable to calculate     Lab Results   Component Value Date    LDLHDL  08/28/2019      Comment:      Unable to calculate         Not on  statin       Blood Pressure Management    Not on ACEi          Microvascular Complication Monitoring      Last eye exam July 2020 , no DR       No results found for: MICROALBUR, [id number]       Neuropathy -  Bilateral feet     On elavil     Bone Health      Lab Results   Component Value Date    CALCIUM 9.3 08/28/2019           Other Diabetes Related Aspects    No results found for: [id number]      Thyroid health      Lab Results   Component Value Date    TSH 2.640 08/28/2019       Return in about 4 weeks (around 8/26/2020) for Recheck.      Records from  received and reviewed from 2020  Thank you for this consultation             This document has been electronically signed by TRACI Salazar on July 29, 2020 16:37

## 2020-07-29 NOTE — PATIENT INSTRUCTIONS
Novolin 70/30 units -- taking 55 units at night --stopped     Metformin -- stopped due to side effects       Start Toujeo 24 units once daily --long acting       Start Lyumjev --short acting    Mealtime insulin    Give 6 units  before small meal    Give 8 units before large meal           Goals for sugar    Fasting and before meals 80 to 130    2 hours after meals 180 or less      Aim for 45 grams of carbohydrate per meal    Aim for 15 grams of carbohydrate per snack

## 2020-08-28 ENCOUNTER — OFFICE VISIT (OUTPATIENT)
Dept: ENDOCRINOLOGY | Facility: CLINIC | Age: 45
End: 2020-08-28

## 2020-08-28 VITALS
BODY MASS INDEX: 25.77 KG/M2 | OXYGEN SATURATION: 100 % | HEART RATE: 79 BPM | SYSTOLIC BLOOD PRESSURE: 100 MMHG | HEIGHT: 61 IN | WEIGHT: 136.5 LBS | DIASTOLIC BLOOD PRESSURE: 54 MMHG

## 2020-08-28 DIAGNOSIS — E55.9 VITAMIN D DEFICIENCY: ICD-10-CM

## 2020-08-28 DIAGNOSIS — E11.42 DIABETIC POLYNEUROPATHY ASSOCIATED WITH TYPE 2 DIABETES MELLITUS (HCC): ICD-10-CM

## 2020-08-28 DIAGNOSIS — E11.8 TYPE 2 DIABETES MELLITUS WITH COMPLICATION, WITHOUT LONG-TERM CURRENT USE OF INSULIN (HCC): Primary | ICD-10-CM

## 2020-08-28 PROCEDURE — 99214 OFFICE O/P EST MOD 30 MIN: CPT | Performed by: NURSE PRACTITIONER

## 2020-08-28 NOTE — PROGRESS NOTES
Subjective    Carol Perez is a 45 y.o. female. she is here today for follow-up.    History of Present Illness     IN OFFICE VISIT     Referring provider Kieran Valle MD      45 year old female presents for follow up      Reason - diabetes mellitus type 2 not controlled      Duration/Timing -diagnosed in 2017/ constant         Quality -not controlled         Severity - high due to hyperglycemia         Severity (Complication/Hospitalizations)        Secondary Macrovascular--- no CAD, no PAD, no CVA        Secondary Microvascular-- neuropathy , no diabetic retinopathy, no renal disease         Context--presented with symptoms     She did have gestational diabetes            Diabetes Regimen--insulin                     Lab Results   Component Value Date     HGBA1C 14 07/29/2020            Blood Glucose Readings     Checking 2 times daily      States 300     No low sugars         Diet-   Regular      Associated Signs/Symptoms       Hyperglycemic Symptoms: increased thirst, urination        Hypoglycemic Episodes: none        The following portions of the patient's history were reviewed and updated as appropriate:   Past Medical History:   Diagnosis Date   • Encounter for insertion of intrauterine contraceptive device      replace mirena      • History of female sterilization    • IUD check up    • Pain in pelvis    • Surgical follow-up care      Past Surgical History:   Procedure Laterality Date   • HYSTEROSCOPY TUBAL STERILIZATION  10/22/2014    Essure tubal sterilization precedure.   • INJECTION OF MEDICATION  09/03/2014    Dep Provera   • PAP SMEAR  03/10/2008    neg   • VAGINAL DELIVERY  04/05/2008     Family History   Problem Relation Age of Onset   • Diabetes Mother    • Heart disease Mother    • Rheum arthritis Father    • Breast cancer Neg Hx    • Colon cancer Neg Hx    • Endometrial cancer Neg Hx    • Ovarian cancer Neg Hx      OB History    None       Current Outpatient Medications   Medication  "Sig Dispense Refill   • amitriptyline (ELAVIL) 10 MG tablet Take 1-3 tablets by mouth Every Night. 90 tablet 11   • cetirizine (zyrTEC) 10 MG tablet Take 1 tablet by mouth Daily. 30 tablet 11   • Insulin Glargine, 1 Unit Dial, (Toujeo SoloStar) 300 UNIT/ML solution pen-injector injection Take 30 units once daily 3 pen 11   • Insulin Lispro-aabc, 1 U Dial, (Lyumjev KwikPen) 100 UNIT/ML solution pen-injector Inject  under the skin into the appropriate area as directed. 6 un with meals     • Insulin Pen Needle (AboutTime Pen Needle) 32G X 4 MM misc Inject 4 times daily 120 each 11   • Insulin Syringe 30G X 5/16\" 1 ML misc 1 each 2 (Two) Times a Day. 100 each 11   • Lancets (FREESTYLE) lancets 1 each by Other route 2 (Two) Times a Day As Needed (sugar). 100 each 12   • pseudoephedrine (Sudafed) 30 MG tablet Take 1 tablet by mouth Every 4 (Four) Hours As Needed for Congestion. 30 tablet 0   • glucose blood (FREESTYLE TEST STRIPS) test strip 1 each by Other route 2 (Two) Times a Day As Needed (sugar). 100 each 12     No current facility-administered medications for this visit.      No Known Allergies  Social History     Socioeconomic History   • Marital status:      Spouse name: Not on file   • Number of children: Not on file   • Years of education: Not on file   • Highest education level: Not on file   Tobacco Use   • Smoking status: Never Smoker   • Smokeless tobacco: Never Used   Substance and Sexual Activity   • Alcohol use: No   • Drug use: No   • Sexual activity: Defer       Review of Systems  Review of Systems   Constitutional: Negative for activity change, appetite change, diaphoresis and fatigue.   HENT: Negative for facial swelling, sneezing, sore throat, tinnitus, trouble swallowing and voice change.    Eyes: Negative for photophobia, pain, discharge, redness, itching and visual disturbance.   Respiratory: Negative for apnea, cough, choking, chest tightness and shortness of breath.    Cardiovascular: " "Negative for chest pain, palpitations and leg swelling.   Gastrointestinal: Negative for abdominal distention, abdominal pain, constipation, diarrhea, nausea and vomiting.   Endocrine: Negative for cold intolerance, heat intolerance, polydipsia, polyphagia and polyuria.   Genitourinary: Negative for difficulty urinating, dysuria, frequency, hematuria and urgency.   Musculoskeletal: Negative for arthralgias, back pain, gait problem, joint swelling, myalgias, neck pain and neck stiffness.   Skin: Negative for color change, pallor, rash and wound.   Neurological: Negative for dizziness, tremors, weakness, light-headedness, numbness and headaches.   Hematological: Negative for adenopathy. Does not bruise/bleed easily.   Psychiatric/Behavioral: Negative for behavioral problems, confusion and sleep disturbance.        Objective    /54   Pulse 79   Ht 154.9 cm (61\")   Wt 61.9 kg (136 lb 8 oz)   SpO2 100%   BMI 25.79 kg/m²   Physical Exam   Constitutional: She is oriented to person, place, and time. She appears well-developed and well-nourished. No distress.   HENT:   Head: Normocephalic and atraumatic.   Right Ear: External ear normal.   Left Ear: External ear normal.   Nose: Nose normal.   Eyes: Pupils are equal, round, and reactive to light. Conjunctivae and EOM are normal.   Neck: Normal range of motion. Neck supple. No tracheal deviation present. No thyromegaly present.   Cardiovascular: Normal rate, regular rhythm and normal heart sounds.   No murmur heard.  Pulmonary/Chest: Effort normal and breath sounds normal. No respiratory distress. She has no wheezes.   Abdominal: Soft. Bowel sounds are normal. There is no tenderness. There is no rebound and no guarding.   Musculoskeletal: Normal range of motion. She exhibits no edema, tenderness or deformity.   Neurological: She is alert and oriented to person, place, and time. No cranial nerve deficit.   Skin: Skin is warm and dry. No rash noted.   Psychiatric: She " "has a normal mood and affect. Her behavior is normal. Judgment and thought content normal.       Lab Review  Glucose (mg/dL)   Date Value   08/28/2019 315 (H)     Sodium (mmol/L)   Date Value   08/28/2019 135 (L)     Potassium (mmol/L)   Date Value   08/28/2019 4.2     Chloride (mmol/L)   Date Value   08/28/2019 100     CO2 (mmol/L)   Date Value   08/28/2019 22.3     BUN (mg/dL)   Date Value   08/28/2019 7     Creatinine (mg/dL)   Date Value   08/28/2019 0.70     Hemoglobin A1C (%)   Date Value   07/29/2020 14   08/28/2019 13.27 (H)     Triglycerides (mg/dL)   Date Value   08/28/2019 413 (H)     LDL Cholesterol    Date Value   08/28/2019      Comment:     Unable to calculate   08/28/2019 217 mg/dL (H)       Assessment/Plan      1. Type 2 diabetes mellitus with complication, without long-term current use of insulin (CMS/[facility])    2. Diabetic polyneuropathy associated with type 2 diabetes mellitus (CMS/[facility])    3. Vitamin D deficiency    .    Medications prescribed:  Outpatient Encounter Medications as of 8/28/2020   Medication Sig Dispense Refill   • amitriptyline (ELAVIL) 10 MG tablet Take 1-3 tablets by mouth Every Night. 90 tablet 11   • cetirizine (zyrTEC) 10 MG tablet Take 1 tablet by mouth Daily. 30 tablet 11   • Insulin Glargine, 1 Unit Dial, (Toujeo SoloStar) 300 UNIT/ML solution pen-injector injection Take 30 units once daily 3 pen 11   • Insulin Lispro-aabc, 1 U Dial, (Lyumjev KwikPen) 100 UNIT/ML solution pen-injector Inject  under the skin into the appropriate area as directed. 6 un with meals     • Insulin Pen Needle (AboutTime Pen Needle) 32G X 4 MM misc Inject 4 times daily 120 each 11   • Insulin Syringe 30G X 5/16\" 1 ML misc 1 each 2 (Two) Times a Day. 100 each 11   • Lancets (FREESTYLE) lancets 1 each by Other route 2 (Two) Times a Day As Needed (sugar). 100 each 12   • pseudoephedrine (Sudafed) 30 MG tablet Take 1 tablet by mouth Every 4 (Four) Hours As Needed for Congestion. 30 tablet 0   • glucose " blood (FREESTYLE TEST STRIPS) test strip 1 each by Other route 2 (Two) Times a Day As Needed (sugar). 100 each 12   • [DISCONTINUED] insulin NPH-insulin regular (NovoLIN 70/30) (70-30) 100 UNIT/ML injection Start 30u ac breakfast and 20u ac supper. 20 mL 0     No facility-administered encounter medications on file as of 8/28/2020.        Orders placed during this encounter include:  Orders Placed This Encounter   Procedures   • Comprehensive Metabolic Panel     Standing Status:   Future     Standing Expiration Date:   8/28/2021   • Lipid Panel     Standing Status:   Future     Standing Expiration Date:   8/28/2021   • Hemoglobin A1c     Standing Status:   Future     Standing Expiration Date:   8/28/2021   • Vitamin D 25 Hydroxy     Standing Status:   Future     Standing Expiration Date:   8/28/2021   • Vitamin B12     Standing Status:   Future     Standing Expiration Date:   8/28/2021   • TSH     Standing Status:   Future     Standing Expiration Date:   8/28/2021   • Protein / Creatinine Ratio, Urine - Urine, Clean Catch     Standing Status:   Future     Standing Expiration Date:   8/28/2021   • Microalbumin / Creatinine Urine Ratio - Urine, Clean Catch     Standing Status:   Future     Standing Expiration Date:   8/28/2021   • CBC & Differential     Standing Status:   Future     Standing Expiration Date:   8/28/2021     Order Specific Question:   Manual Differential     Answer:   No     Glycemic Management     Diabetes mellitus type 2               Lab Results   Component Value Date     HGBA1C 14 07/29/2020         Metformin stopped due to side effects         Novolin 70/30 units -- taking 55 units at night -she is not taking the daytime dosage--- stop today and change to the following      New regimen :         Toujeo 24 units once daily --- increase to 28 units      If you wake up with a blood glucose level less than 80 decrease by 4 units                Lyumjev --short actiing mealtime insulin         Give 6  units  before small meal----increase to 10 units      Give 8 units before large meal ---- increase 12 units    ---  Samples given and RX called in      Goals for sugar     Fasting and before meals 80 to 130     2 hours after meals 180 or less        Aim for 45 grams of carbohydrate per meal     Aim for 15 grams of carbohydrate per snack                        Lipid Management              Lab Results   Component Value Date     CHOL 274 (H) 08/28/2019            Lab Results   Component Value Date     TRIG 413 (H) 08/28/2019            Lab Results   Component Value Date     HDL 37 (L) 08/28/2019              Lab Results   Component Value Date     LDL   08/28/2019         Comment:         Unable to calculate      (H) 08/28/2019              Lab Results   Component Value Date     VLDL   08/28/2019         Comment:         Unable to calculate              Lab Results   Component Value Date     LDLHDL   08/28/2019         Comment:         Unable to calculate            Not on statin         Blood Pressure Management     Not on ACEi              Microvascular Complication Monitoring        Last eye exam July 2020 , no DR         No results found for: MICROALBUR, [id number]         Neuropathy -  Bilateral feet      On elavil      Bone Health              Lab Results   Component Value Date     CALCIUM 9.3 08/28/2019          vitamin d def.    Reassess      Other Diabetes Related Aspects     No results found for: [id number]        Thyroid health              Lab Results   Component Value Date     TSH 2.640 08/28/2019               4. Follow-up: Return in about 2 months (around 10/28/2020).

## 2020-10-19 ENCOUNTER — HOSPITAL ENCOUNTER (EMERGENCY)
Facility: HOSPITAL | Age: 45
Discharge: HOME | End: 2020-10-19
Attending: EMERGENCY MEDICINE | Admitting: EMERGENCY MEDICINE

## 2020-10-19 ENCOUNTER — APPOINTMENT (OUTPATIENT)
Dept: CT IMAGING | Facility: HOSPITAL | Age: 45
End: 2020-10-19

## 2020-10-19 VITALS
HEART RATE: 94 BPM | RESPIRATION RATE: 18 BRPM | SYSTOLIC BLOOD PRESSURE: 111 MMHG | BODY MASS INDEX: 27.09 KG/M2 | HEIGHT: 60 IN | WEIGHT: 138 LBS | TEMPERATURE: 97.4 F | OXYGEN SATURATION: 97 % | DIASTOLIC BLOOD PRESSURE: 65 MMHG

## 2020-10-19 DIAGNOSIS — R51.9 ACUTE NONINTRACTABLE HEADACHE, UNSPECIFIED HEADACHE TYPE: ICD-10-CM

## 2020-10-19 DIAGNOSIS — S39.012A LUMBOSACRAL STRAIN, INITIAL ENCOUNTER: ICD-10-CM

## 2020-10-19 DIAGNOSIS — S16.1XXA STRAIN OF NECK MUSCLE, INITIAL ENCOUNTER: ICD-10-CM
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
## 2020-10-19 LAB — B-HCG UR QL: NEGATIVE

## 2020-10-19 PROCEDURE — 81025 URINE PREGNANCY TEST: CPT | Performed by: EMERGENCY MEDICINE

## 2020-10-19 PROCEDURE — 70450 CT HEAD/BRAIN W/O DYE: CPT

## 2020-10-19 PROCEDURE — 99283 EMERGENCY DEPT VISIT LOW MDM: CPT

## 2020-10-19 PROCEDURE — 72125 CT NECK SPINE W/O DYE: CPT

## 2020-10-19 PROCEDURE — 72128 CT CHEST SPINE W/O DYE: CPT

## 2020-10-19 PROCEDURE — 72131 CT LUMBAR SPINE W/O DYE: CPT

## 2020-10-19 RX ORDER — CYCLOBENZAPRINE HCL 10 MG
10 TABLET ORAL 3 TIMES DAILY PRN
Qty: 10 TABLET | Refills: 0 | Status: SHIPPED | OUTPATIENT
Start: 2020-10-19 | End: 2021-01-28

## 2020-10-19 RX ORDER — ACETAMINOPHEN 500 MG
1000 TABLET ORAL ONCE
Status: COMPLETED | OUTPATIENT
Start: 2020-10-19 | End: 2020-10-19

## 2020-10-19 RX ORDER — HYDROCODONE BITARTRATE AND ACETAMINOPHEN 5; 325 MG/1; MG/1
1 TABLET ORAL ONCE
Status: COMPLETED | OUTPATIENT
Start: 2020-10-19 | End: 2020-10-19

## 2020-10-19 RX ADMIN — ACETAMINOPHEN 1000 MG: 500 TABLET ORAL at 19:49

## 2020-10-19 RX ADMIN — HYDROCODONE BITARTRATE AND ACETAMINOPHEN 1 TABLET: 5; 325 TABLET ORAL at 21:19
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
## 2020-10-20 NOTE — DISCHARGE INSTRUCTIONS
Please return with new or worsening symptoms.  Muscle relaxer medication provided to use as needed over the next few days to help with discomfort.  May also use Tylenol and Motrin alternating.  Heating pad or hot shower to help with discomfort.
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
## 2020-10-27 ENCOUNTER — LAB (OUTPATIENT)
Dept: LAB | Facility: HOSPITAL | Age: 45
End: 2020-10-27

## 2020-10-27 DIAGNOSIS — E11.42 DIABETIC POLYNEUROPATHY ASSOCIATED WITH TYPE 2 DIABETES MELLITUS (HCC): ICD-10-CM

## 2020-10-27 DIAGNOSIS — E55.9 VITAMIN D DEFICIENCY: ICD-10-CM

## 2020-10-27 DIAGNOSIS — E11.8 TYPE 2 DIABETES MELLITUS WITH COMPLICATION, WITHOUT LONG-TERM CURRENT USE OF INSULIN (HCC): ICD-10-CM

## 2020-10-27 LAB
25(OH)D3 SERPL-MCNC: 20 NG/ML (ref 30–100)
ALBUMIN SERPL-MCNC: 4.6 G/DL (ref 3.5–5.2)
ALBUMIN UR-MCNC: 2 MG/DL
ALBUMIN/GLOB SERPL: 1.5 G/DL
ALP SERPL-CCNC: 91 U/L (ref 39–117)
ALT SERPL W P-5'-P-CCNC: 48 U/L (ref 1–33)
ANION GAP SERPL CALCULATED.3IONS-SCNC: 12.1 MMOL/L (ref 5–15)
AST SERPL-CCNC: 49 U/L (ref 1–32)
BASOPHILS # BLD AUTO: 0.07 10*3/MM3 (ref 0–0.2)
BASOPHILS NFR BLD AUTO: 1.3 % (ref 0–1.5)
BILIRUB SERPL-MCNC: 0.3 MG/DL (ref 0–1.2)
BUN SERPL-MCNC: 7 MG/DL (ref 6–20)
BUN/CREAT SERPL: 12.5 (ref 7–25)
CALCIUM SPEC-SCNC: 9.4 MG/DL (ref 8.6–10.5)
CHLORIDE SERPL-SCNC: 102 MMOL/L (ref 98–107)
CHOLEST SERPL-MCNC: 308 MG/DL (ref 0–200)
CO2 SERPL-SCNC: 23.9 MMOL/L (ref 22–29)
CREAT SERPL-MCNC: 0.56 MG/DL (ref 0.57–1)
CREAT UR-MCNC: 50.6 MG/DL
CREAT UR-MCNC: 50.6 MG/DL
DEPRECATED RDW RBC AUTO: 41 FL (ref 37–54)
EOSINOPHIL # BLD AUTO: 0.18 10*3/MM3 (ref 0–0.4)
EOSINOPHIL NFR BLD AUTO: 3.4 % (ref 0.3–6.2)
ERYTHROCYTE [DISTWIDTH] IN BLOOD BY AUTOMATED COUNT: 14.7 % (ref 12.3–15.4)
GFR SERPL CREATININE-BSD FRML MDRD: 118 ML/MIN/1.73
GLOBULIN UR ELPH-MCNC: 3 GM/DL
GLUCOSE SERPL-MCNC: 313 MG/DL (ref 65–99)
HBA1C MFR BLD: 13.3 % (ref 4.8–5.6)
HCT VFR BLD AUTO: 40.4 % (ref 34–46.6)
HDLC SERPL-MCNC: 48 MG/DL (ref 40–60)
HGB BLD-MCNC: 13.1 G/DL (ref 12–15.9)
IMM GRANULOCYTES # BLD AUTO: 0.02 10*3/MM3 (ref 0–0.05)
IMM GRANULOCYTES NFR BLD AUTO: 0.4 % (ref 0–0.5)
LDLC SERPL CALC-MCNC: 199 MG/DL (ref 0–100)
LDLC/HDLC SERPL: 4.13 {RATIO}
LYMPHOCYTES # BLD AUTO: 1.3 10*3/MM3 (ref 0.7–3.1)
LYMPHOCYTES NFR BLD AUTO: 24.3 % (ref 19.6–45.3)
MCH RBC QN AUTO: 25.2 PG (ref 26.6–33)
MCHC RBC AUTO-ENTMCNC: 32.4 G/DL (ref 31.5–35.7)
MCV RBC AUTO: 77.7 FL (ref 79–97)
MICROALBUMIN/CREAT UR: 39.5 MG/G
MONOCYTES # BLD AUTO: 0.5 10*3/MM3 (ref 0.1–0.9)
MONOCYTES NFR BLD AUTO: 9.3 % (ref 5–12)
NEUTROPHILS NFR BLD AUTO: 3.29 10*3/MM3 (ref 1.7–7)
NEUTROPHILS NFR BLD AUTO: 61.3 % (ref 42.7–76)
NRBC BLD AUTO-RTO: 0 /100 WBC (ref 0–0.2)
PLATELET # BLD AUTO: 348 10*3/MM3 (ref 140–450)
PMV BLD AUTO: 10.7 FL (ref 6–12)
POTASSIUM SERPL-SCNC: 4.4 MMOL/L (ref 3.5–5.2)
PROT SERPL-MCNC: 7.6 G/DL (ref 6–8.5)
PROT UR-MCNC: 9 MG/DL
PROT/CREAT UR: 177.9 MG/G CREA (ref 0–200)
RBC # BLD AUTO: 5.2 10*6/MM3 (ref 3.77–5.28)
SODIUM SERPL-SCNC: 138 MMOL/L (ref 136–145)
TRIGL SERPL-MCNC: 308 MG/DL (ref 0–150)
TSH SERPL DL<=0.05 MIU/L-ACNC: 2.3 UIU/ML (ref 0.27–4.2)
VIT B12 BLD-MCNC: 607 PG/ML (ref 211–946)
VLDLC SERPL-MCNC: 61 MG/DL (ref 5–40)
WBC # BLD AUTO: 5.36 10*3/MM3 (ref 3.4–10.8)

## 2020-10-27 PROCEDURE — 82607 VITAMIN B-12: CPT

## 2020-10-27 PROCEDURE — 84156 ASSAY OF PROTEIN URINE: CPT

## 2020-10-27 PROCEDURE — 84443 ASSAY THYROID STIM HORMONE: CPT

## 2020-10-27 PROCEDURE — 80061 LIPID PANEL: CPT

## 2020-10-27 PROCEDURE — 80053 COMPREHEN METABOLIC PANEL: CPT

## 2020-10-27 PROCEDURE — 85025 COMPLETE CBC W/AUTO DIFF WBC: CPT

## 2020-10-27 PROCEDURE — 82306 VITAMIN D 25 HYDROXY: CPT

## 2020-10-27 PROCEDURE — 82043 UR ALBUMIN QUANTITATIVE: CPT

## 2020-10-27 PROCEDURE — 36415 COLL VENOUS BLD VENIPUNCTURE: CPT

## 2020-10-27 PROCEDURE — 82570 ASSAY OF URINE CREATININE: CPT

## 2020-10-27 PROCEDURE — 83036 HEMOGLOBIN GLYCOSYLATED A1C: CPT

## 2020-10-28 ENCOUNTER — OFFICE VISIT (OUTPATIENT)
Dept: ENDOCRINOLOGY | Facility: CLINIC | Age: 45
End: 2020-10-28

## 2020-10-28 VITALS
DIASTOLIC BLOOD PRESSURE: 56 MMHG | SYSTOLIC BLOOD PRESSURE: 104 MMHG | WEIGHT: 138.9 LBS | OXYGEN SATURATION: 100 % | BODY MASS INDEX: 27.27 KG/M2 | HEART RATE: 88 BPM | HEIGHT: 60 IN

## 2020-10-28 DIAGNOSIS — E78.2 MIXED HYPERLIPIDEMIA: ICD-10-CM

## 2020-10-28 DIAGNOSIS — E11.42 DIABETIC POLYNEUROPATHY ASSOCIATED WITH TYPE 2 DIABETES MELLITUS (HCC): ICD-10-CM

## 2020-10-28 DIAGNOSIS — E11.8 TYPE 2 DIABETES MELLITUS WITH COMPLICATION, WITHOUT LONG-TERM CURRENT USE OF INSULIN (HCC): Primary | ICD-10-CM

## 2020-10-28 DIAGNOSIS — E55.9 VITAMIN D DEFICIENCY: ICD-10-CM

## 2020-10-28 PROCEDURE — 99214 OFFICE O/P EST MOD 30 MIN: CPT | Performed by: NURSE PRACTITIONER

## 2020-10-28 RX ORDER — ROSUVASTATIN CALCIUM 10 MG/1
10 TABLET, COATED ORAL NIGHTLY
Qty: 30 TABLET | Refills: 11 | Status: SHIPPED | OUTPATIENT
Start: 2020-10-28 | End: 2021-01-28 | Stop reason: SDUPTHER

## 2020-10-28 RX ORDER — ERGOCALCIFEROL 1.25 MG/1
50000 CAPSULE ORAL WEEKLY
Qty: 5 CAPSULE | Refills: 11 | Status: SHIPPED | OUTPATIENT
Start: 2020-10-28 | End: 2021-01-28 | Stop reason: SDUPTHER

## 2020-10-28 RX ORDER — FLUCONAZOLE 150 MG/1
TABLET ORAL
Qty: 3 TABLET | Refills: 0 | Status: SHIPPED | OUTPATIENT
Start: 2020-10-28 | End: 2022-03-28

## 2020-10-28 NOTE — PROGRESS NOTES
Subjective    Carol Perez is a 45 y.o. female. she is here today for follow-up.    History of Present Illness       IN OFFICE VISIT     Referring provider Kieran Valle MD      45 year old female presents for follow up      Reason - diabetes mellitus type 2 not controlled      Duration/Timing -diagnosed in 2017/ constant         Quality -not controlled         Severity - high due to hyperglycemia         Severity (Complication/Hospitalizations)        Secondary Macrovascular--- no CAD, no PAD, no CVA        Secondary Microvascular-- neuropathy , no diabetic retinopathy, no renal disease         Context--presented with symptoms     She did have gestational diabetes            Diabetes Regimen--insulin         Lab Results   Component Value Date    HGBA1C 13.30 (H) 10/27/2020             Blood Glucose Readings     Checking 2 times daily      Am readings 300     Daytime 300      Diet-   Regular      Associated Signs/Symptoms       Hyperglycemic Symptoms: increased thirst, urination        Hypoglycemic Episodes: none         history reviewed and no changes         The following portions of the patient's history were reviewed and updated as appropriate:   Past Medical History:   Diagnosis Date   • Encounter for insertion of intrauterine contraceptive device      replace mirena      • History of female sterilization    • IUD check up    • Pain in pelvis    • Surgical follow-up care      Past Surgical History:   Procedure Laterality Date   • HYSTEROSCOPY TUBAL STERILIZATION  10/22/2014    Essure tubal sterilization precedure.   • INJECTION OF MEDICATION  09/03/2014    Dep Provera   • PAP SMEAR  03/10/2008    neg   • VAGINAL DELIVERY  04/05/2008     Family History   Problem Relation Age of Onset   • Diabetes Mother    • Heart disease Mother    • Rheum arthritis Father    • Breast cancer Neg Hx    • Colon cancer Neg Hx    • Endometrial cancer Neg Hx    • Ovarian cancer Neg Hx      OB History    No obstetric  "history on file.       Current Outpatient Medications   Medication Sig Dispense Refill   • cyclobenzaprine (FLEXERIL) 10 MG tablet Take 1 tablet by mouth 3 (Three) Times a Day As Needed for Muscle Spasms. 10 tablet 0   • glucose blood (FREESTYLE TEST STRIPS) test strip 1 each by Other route 2 (Two) Times a Day As Needed (sugar). 100 each 12   • Insulin Glargine, 1 Unit Dial, (Toujeo SoloStar) 300 UNIT/ML solution pen-injector injection Take 30 units once daily 3 pen 11   • Insulin Lispro-aabc, 1 U Dial, (Debbieumerica KwikPen) 100 UNIT/ML solution pen-injector Inject  under the skin into the appropriate area as directed. 6 un with meals     • Insulin Pen Needle (AboutTime Pen Needle) 32G X 4 MM misc Inject 4 times daily 120 each 11   • Insulin Syringe 30G X 5/16\" 1 ML misc 1 each 2 (Two) Times a Day. 100 each 11   • Lancets (FREESTYLE) lancets 1 each by Other route 2 (Two) Times a Day As Needed (sugar). 100 each 12   • fluconazole (Diflucan) 150 MG tablet One tablet every 3 days for 3 doses 3 tablet 0   • rosuvastatin (Crestor) 10 MG tablet Take 1 tablet by mouth Every Night. 30 tablet 11   • vitamin D (ERGOCALCIFEROL) 1.25 MG (94160 UT) capsule capsule Take 1 capsule by mouth 1 (One) Time Per Week. 5 capsule 11     No current facility-administered medications for this visit.      No Known Allergies  Social History     Socioeconomic History   • Marital status:      Spouse name: Not on file   • Number of children: Not on file   • Years of education: Not on file   • Highest education level: Not on file   Tobacco Use   • Smoking status: Never Smoker   • Smokeless tobacco: Never Used   Substance and Sexual Activity   • Alcohol use: No   • Drug use: No   • Sexual activity: Defer       Review of Systems  Review of Systems   Constitutional: Negative for activity change, appetite change, diaphoresis and fatigue.   HENT: Negative for facial swelling, sneezing, sore throat, tinnitus, trouble swallowing and voice change.  " "  Eyes: Negative for photophobia, pain, discharge, redness, itching and visual disturbance.   Respiratory: Negative for apnea, cough, choking, chest tightness and shortness of breath.    Cardiovascular: Negative for chest pain, palpitations and leg swelling.   Gastrointestinal: Negative for abdominal distention, abdominal pain, constipation, diarrhea, nausea and vomiting.   Endocrine: Negative for cold intolerance, heat intolerance, polydipsia, polyphagia and polyuria.   Genitourinary: Negative for difficulty urinating, dysuria, frequency, hematuria and urgency.   Musculoskeletal: Positive for myalgias. Negative for arthralgias, back pain, gait problem, joint swelling, neck pain and neck stiffness.   Skin: Negative for color change, pallor, rash and wound.   Neurological: Negative for dizziness, tremors, weakness, light-headedness, numbness and headaches.   Hematological: Negative for adenopathy. Does not bruise/bleed easily.   Psychiatric/Behavioral: Negative for behavioral problems, confusion and sleep disturbance.        Objective    /56   Pulse 88   Ht 152.4 cm (60\")   Wt 63 kg (138 lb 14.4 oz)   LMP 10/19/2020   SpO2 100%   BMI 27.13 kg/m²   Physical Exam  Constitutional:       General: She is not in acute distress.     Appearance: She is well-developed.   HENT:      Head: Normocephalic and atraumatic.      Right Ear: External ear normal.      Left Ear: External ear normal.      Nose: Nose normal.   Eyes:      Conjunctiva/sclera: Conjunctivae normal.      Pupils: Pupils are equal, round, and reactive to light.   Neck:      Musculoskeletal: Normal range of motion and neck supple.      Thyroid: No thyromegaly.      Trachea: No tracheal deviation.   Cardiovascular:      Rate and Rhythm: Normal rate and regular rhythm.      Pulses:           Dorsalis pedis pulses are 2+ on the right side and 2+ on the left side.        Posterior tibial pulses are 2+ on the right side and 2+ on the left side.      Heart " sounds: Normal heart sounds. No murmur.   Pulmonary:      Effort: Pulmonary effort is normal. No respiratory distress.      Breath sounds: Normal breath sounds. No wheezing.   Abdominal:      General: Bowel sounds are normal.      Palpations: Abdomen is soft.      Tenderness: There is no abdominal tenderness. There is no guarding or rebound.   Musculoskeletal: Normal range of motion.         General: No tenderness or deformity.   Feet:      Right foot:      Protective Sensation: 5 sites tested. 1 site sensed.     Skin integrity: Skin integrity normal.      Toenail Condition: Right toenails are normal.      Left foot:      Protective Sensation: 5 sites tested. 1 site sensed.     Skin integrity: Skin integrity normal.      Toenail Condition: Left toenails are normal.      Comments: Diabetic Foot Exam Performed and Monofilament Test Performed  Skin:     General: Skin is warm and dry.      Findings: No rash.   Neurological:      Mental Status: She is alert and oriented to person, place, and time.      Cranial Nerves: No cranial nerve deficit.   Psychiatric:         Behavior: Behavior normal.         Thought Content: Thought content normal.         Judgment: Judgment normal.         Lab Review  Glucose (mg/dL)   Date Value   10/27/2020 313 (H)   08/28/2019 315 (H)     Sodium (mmol/L)   Date Value   10/27/2020 138   08/28/2019 135 (L)     Potassium (mmol/L)   Date Value   10/27/2020 4.4   08/28/2019 4.2     Chloride (mmol/L)   Date Value   10/27/2020 102   08/28/2019 100     CO2 (mmol/L)   Date Value   10/27/2020 23.9   08/28/2019 22.3     BUN (mg/dL)   Date Value   10/27/2020 7   08/28/2019 7     Creatinine (mg/dL)   Date Value   10/27/2020 0.56 (L)   08/28/2019 0.70     Hemoglobin A1C (%)   Date Value   10/27/2020 13.30 (H)   07/29/2020 14   08/28/2019 13.27 (H)     Triglycerides (mg/dL)   Date Value   10/27/2020 308 (H)   08/28/2019 413 (H)     LDL Cholesterol    Date Value   10/27/2020 199 mg/dL (H)   08/28/2019       "Comment:     Unable to calculate   08/28/2019 217 mg/dL (H)       Assessment/Plan      1. Type 2 diabetes mellitus with complication, without long-term current use of insulin (CMS/[facility])    2. Diabetic polyneuropathy associated with type 2 diabetes mellitus (CMS/[facility])    3. Vitamin D deficiency    4. Mixed hyperlipidemia    .    Medications prescribed:  Outpatient Encounter Medications as of 10/28/2020   Medication Sig Dispense Refill   • cyclobenzaprine (FLEXERIL) 10 MG tablet Take 1 tablet by mouth 3 (Three) Times a Day As Needed for Muscle Spasms. 10 tablet 0   • glucose blood (FREESTYLE TEST STRIPS) test strip 1 each by Other route 2 (Two) Times a Day As Needed (sugar). 100 each 12   • Insulin Glargine, 1 Unit Dial, (Toujeo SoloStar) 300 UNIT/ML solution pen-injector injection Take 30 units once daily 3 pen 11   • Insulin Lispro-aabc, 1 U Dial, (Debbieumerica KwikPen) 100 UNIT/ML solution pen-injector Inject  under the skin into the appropriate area as directed. 6 un with meals     • Insulin Pen Needle (AboutTime Pen Needle) 32G X 4 MM misc Inject 4 times daily 120 each 11   • Insulin Syringe 30G X 5/16\" 1 ML misc 1 each 2 (Two) Times a Day. 100 each 11   • Lancets (FREESTYLE) lancets 1 each by Other route 2 (Two) Times a Day As Needed (sugar). 100 each 12   • fluconazole (Diflucan) 150 MG tablet One tablet every 3 days for 3 doses 3 tablet 0   • rosuvastatin (Crestor) 10 MG tablet Take 1 tablet by mouth Every Night. 30 tablet 11   • vitamin D (ERGOCALCIFEROL) 1.25 MG (62465 UT) capsule capsule Take 1 capsule by mouth 1 (One) Time Per Week. 5 capsule 11     No facility-administered encounter medications on file as of 10/28/2020.        Orders placed during this encounter include:  Orders Placed This Encounter   Procedures   • Comprehensive Metabolic Panel     Standing Status:   Future     Standing Expiration Date:   10/28/2021   • Hemoglobin A1c     Standing Status:   Future     Standing Expiration Date:   10/28/2021   • " Lipid Panel     Standing Status:   Future     Standing Expiration Date:   10/28/2021   • TSH     Standing Status:   Future     Standing Expiration Date:   10/28/2021   • Vitamin B12     Standing Status:   Future     Standing Expiration Date:   10/28/2021   • Vitamin D 25 Hydroxy     Standing Status:   Future     Standing Expiration Date:   10/28/2021   • CBC & Differential     Standing Status:   Future     Standing Expiration Date:   10/28/2021     Order Specific Question:   Manual Differential     Answer:   No     Glycemic Management     Diabetes mellitus type 2         Lab Results   Component Value Date    HGBA1C 13.30 (H) 10/27/2020       Metformin stopped due to side effects         Novolin 70/30 units -- taking 55 units at night -she is not taking the daytime dosage--- stop today and change to the following      New regimen :         Toujeo taking 26 units ---increase to 30 units      If you wake up with a blood glucose level less than 80 decrease by 4 units                Lyumjev --short actiing mealtime insulin         Giving 8 up to 10 units -- increase to 12 up to 14 units    ---       Goals for sugar     Fasting and before meals 80 to 130     2 hours after meals 180 or less        Aim for 45 grams of carbohydrate per meal     Aim for 15 grams of carbohydrate per snack                        Lipid Management             Component      Latest Ref Rng & Units 10/27/2020   Total Cholesterol      0 - 200 mg/dL 308 (H)   Triglycerides      0 - 150 mg/dL 308 (H)   HDL Cholesterol      40 - 60 mg/dL 48   LDL Cholesterol       0 - 100 mg/dL 199 (H)   VLDL Cholesterol      5 - 40 mg/dL 61 (H)   LDL/HDL Ratio       4.13           Start Crestor 10 mg one night         Blood Pressure Management     Not on ACEi              Microvascular Complication Monitoring        Last eye exam July 2020 , no DR           Component      Latest Ref Rng & Units 10/27/2020 10/27/2020          11:57 AM 11:57 AM   Protein/Creatinine  Ratio      0.0 - 200.0 mg/G Crea 177.9    Creatinine, Urine      mg/dL 50.6 50.6   Total Protein, Urine      mg/dL 9.0    Microalbumin/Creatinine Ratio      mg/g  39.5   Microalbumin, Urine      mg/dL  2.0        Neuropathy -  Bilateral feet      On [facility]      Bone Chrends           vitamin d def.      Component      Latest Ref Rng & Units 10/27/2020   25 Hydroxy, Vitamin D      30.0 - 100.0 ng/ml 20.0 (L)          Start 50,000 units once weekly      Other Diabetes Related Aspects     Lab Results   Component Value Date    [id number] 10/27/2020             Thyroid health        Lab Results   Component Value Date    TSH 2.300 10/27/2020         Yeast infection     Diflucan called in        4. Follow-up: No follow-ups on file.

## 2020-11-09 ENCOUNTER — TELEPHONE (OUTPATIENT)
Dept: ENDOCRINOLOGY | Facility: CLINIC | Age: 45
End: 2020-11-09

## 2020-11-09 RX ORDER — DULOXETIN HYDROCHLORIDE 30 MG/1
30 CAPSULE, DELAYED RELEASE ORAL DAILY
Qty: 30 CAPSULE | Refills: 2 | Status: SHIPPED | OUTPATIENT
Start: 2020-11-09 | End: 2021-01-28

## 2020-11-09 NOTE — TELEPHONE ENCOUNTER
Spoke with pt -  She takes amitriptyline at night - knocks her out so she takes on the weekend. She is requesting meds for daytime? Please advise.

## 2020-11-09 NOTE — TELEPHONE ENCOUNTER
Cymbalta 30 mg one daily , stop the amitriptyline. This should not cause drowsiness.    Cymbalta is for diabetic nerve pain, it is also an antidepressant but we are using for the nerve pain

## 2021-01-25 ENCOUNTER — OFFICE VISIT (OUTPATIENT)
Dept: FAMILY MEDICINE CLINIC | Facility: CLINIC | Age: 46
End: 2021-01-25

## 2021-01-25 VITALS
HEIGHT: 60 IN | WEIGHT: 131.6 LBS | OXYGEN SATURATION: 98 % | HEART RATE: 97 BPM | BODY MASS INDEX: 25.84 KG/M2 | SYSTOLIC BLOOD PRESSURE: 142 MMHG | DIASTOLIC BLOOD PRESSURE: 100 MMHG | TEMPERATURE: 97.1 F

## 2021-01-25 DIAGNOSIS — M54.50 ACUTE MIDLINE LOW BACK PAIN WITHOUT SCIATICA: Primary | ICD-10-CM

## 2021-01-25 DIAGNOSIS — R10.2 PELVIC PAIN: ICD-10-CM

## 2021-01-25 PROCEDURE — 96372 THER/PROPH/DIAG INJ SC/IM: CPT | Performed by: NURSE PRACTITIONER

## 2021-01-25 PROCEDURE — 99213 OFFICE O/P EST LOW 20 MIN: CPT | Performed by: NURSE PRACTITIONER

## 2021-01-25 RX ORDER — AMITRIPTYLINE HYDROCHLORIDE 10 MG/1
10 TABLET, FILM COATED ORAL NIGHTLY
COMMUNITY

## 2021-01-25 RX ORDER — TRIAMCINOLONE ACETONIDE 40 MG/ML
80 INJECTION, SUSPENSION INTRA-ARTICULAR; INTRAMUSCULAR ONCE
Status: COMPLETED | OUTPATIENT
Start: 2021-01-25 | End: 2021-01-25

## 2021-01-25 RX ADMIN — TRIAMCINOLONE ACETONIDE 80 MG: 40 INJECTION, SUSPENSION INTRA-ARTICULAR; INTRAMUSCULAR at 14:46

## 2021-01-25 NOTE — PROGRESS NOTES
Subjective   Carol Perez is a 45 y.o. female. Back pain    History of Present Illness   Back Pain  Patient presents for evaluation of low back problems. Symptoms have been present for 1 day and include pain in lower back, midline (aching in character; 7/10 in severity). Initial inciting event: none. Symptoms are worse do not seem to be realted to certain time of day. Alleviating factors identifiable by the patient are sitting. Aggravating factors identifiable by the patient are standing. Treatments initiated by the patient: ibuprofen and tylenol. Previous lower back problems: none. Previous work up: none. Previous treatments: none. Patient also concerned about pelvic cramping between menstrual cycle. She says her last menstrual cycle was about a month ago. Cycle is irregular but not too heavy or painful.    The following portions of the patient's history were reviewed and updated as appropriate: allergies, current medications, past family history, past medical history, past social history, past surgical history, and problem list.    Review of Systems   Constitutional: Negative for chills, fatigue and fever.   HENT: Negative for congestion, ear pain, rhinorrhea and sore throat.    Eyes: Negative for blurred vision, double vision and visual disturbance.   Respiratory: Negative for cough, chest tightness, shortness of breath and wheezing.    Cardiovascular: Negative for chest pain, palpitations and leg swelling.   Gastrointestinal: Negative for abdominal pain, diarrhea, nausea and vomiting.   Endocrine: Negative for cold intolerance and heat intolerance.   Genitourinary: Negative for difficulty urinating, dysuria, frequency and hematuria.   Musculoskeletal: Positive for back pain. Negative for arthralgias, neck pain and neck stiffness.   Skin: Negative for dry skin, pallor, rash, skin lesions and bruise.   Allergic/Immunologic: Negative for environmental allergies, food allergies and immunocompromised  state.   Neurological: Negative for dizziness, syncope, weakness, light-headedness, headache and confusion.   Hematological: Negative for adenopathy. Does not bruise/bleed easily.   Psychiatric/Behavioral: Negative for self-injury, sleep disturbance, suicidal ideas, depressed mood and stress. The patient is not nervous/anxious.        Vitals:    01/25/21 1349   BP: 142/100   Pulse: 97   Temp: 97.1 °F (36.2 °C)   SpO2: 98%     Body mass index is 25.7 kg/m².    Objective   Physical Exam  Vitals signs and nursing note reviewed.   Constitutional:       Appearance: She is well-developed.   HENT:      Head: Normocephalic.   Eyes:      Conjunctiva/sclera: Conjunctivae normal.   Neck:      Musculoskeletal: Full passive range of motion without pain, normal range of motion and neck supple.   Musculoskeletal: Normal range of motion.      Lumbar back: She exhibits tenderness and pain. She exhibits normal range of motion, no bony tenderness, no swelling, no edema, no deformity, no laceration, no spasm and normal pulse.   Skin:     General: Skin is warm and dry.   Neurological:      Mental Status: She is alert and oriented to person, place, and time.      Coordination: Coordination normal.      Gait: Gait normal.   Psychiatric:         Attention and Perception: Attention normal.         Mood and Affect: Mood normal.         Speech: Speech normal.         Behavior: Behavior normal. Behavior is cooperative.         Thought Content: Thought content normal.         Cognition and Memory: Cognition normal.         Judgment: Judgment normal.           Assessment/Plan   Diagnoses and all orders for this visit:    1. Acute midline low back pain without sciatica (Primary)  -     XR Spine Lumbar 4+ View (In Office)  -     triamcinolone acetonide (KENALOG-40) injection 80 mg    2. Pelvic pain  -     US Non-ob Transvaginal    Acute midline low back pain without sciatica- Xrays reveal mild disc space narrowing L5-S1 and mild arthritic changes.  Kenalog 80 mg given IM to reduce inflammation and pain. Recommended ice, rest, stretching which can also help with pain relief. She can continue taking tylenol or ibuprofen as directed PRN for pain.  Pelvic pain- Ordered Non OB transvaginal ultrasound for further evaluation, will call pt with results.  If symptoms do not improve or worsen, patient was instructed to return to clinic for further evaluation.         This document has been electronically signed by TRACI Young on  January 26, 2021 08:42 CST

## 2021-01-26 ENCOUNTER — LAB (OUTPATIENT)
Dept: LAB | Facility: HOSPITAL | Age: 46
End: 2021-01-26

## 2021-01-26 DIAGNOSIS — E55.9 VITAMIN D DEFICIENCY: ICD-10-CM

## 2021-01-26 DIAGNOSIS — E11.42 DIABETIC POLYNEUROPATHY ASSOCIATED WITH TYPE 2 DIABETES MELLITUS (HCC): ICD-10-CM

## 2021-01-26 DIAGNOSIS — E11.8 TYPE 2 DIABETES MELLITUS WITH COMPLICATION, WITHOUT LONG-TERM CURRENT USE OF INSULIN (HCC): ICD-10-CM

## 2021-01-26 DIAGNOSIS — E78.2 MIXED HYPERLIPIDEMIA: ICD-10-CM

## 2021-01-26 LAB
25(OH)D3 SERPL-MCNC: 15.1 NG/ML (ref 30–100)
ALBUMIN SERPL-MCNC: 4.2 G/DL (ref 3.5–5.2)
ALBUMIN/GLOB SERPL: 1.4 G/DL
ALP SERPL-CCNC: 90 U/L (ref 39–117)
ALT SERPL W P-5'-P-CCNC: 26 U/L (ref 1–33)
ANION GAP SERPL CALCULATED.3IONS-SCNC: 12.1 MMOL/L (ref 5–15)
AST SERPL-CCNC: 18 U/L (ref 1–32)
BASOPHILS # BLD AUTO: 0.06 10*3/MM3 (ref 0–0.2)
BASOPHILS NFR BLD AUTO: 0.8 % (ref 0–1.5)
BILIRUB SERPL-MCNC: 0.2 MG/DL (ref 0–1.2)
BUN SERPL-MCNC: 11 MG/DL (ref 6–20)
BUN/CREAT SERPL: 15.3 (ref 7–25)
CALCIUM SPEC-SCNC: 9.3 MG/DL (ref 8.6–10.5)
CHLORIDE SERPL-SCNC: 101 MMOL/L (ref 98–107)
CHOLEST SERPL-MCNC: 273 MG/DL (ref 0–200)
CO2 SERPL-SCNC: 20.9 MMOL/L (ref 22–29)
CREAT SERPL-MCNC: 0.72 MG/DL (ref 0.57–1)
DEPRECATED RDW RBC AUTO: 40.7 FL (ref 37–54)
EOSINOPHIL # BLD AUTO: 0.02 10*3/MM3 (ref 0–0.4)
EOSINOPHIL NFR BLD AUTO: 0.3 % (ref 0.3–6.2)
ERYTHROCYTE [DISTWIDTH] IN BLOOD BY AUTOMATED COUNT: 14.5 % (ref 12.3–15.4)
GFR SERPL CREATININE-BSD FRML MDRD: 88 ML/MIN/1.73
GLOBULIN UR ELPH-MCNC: 3 GM/DL
GLUCOSE SERPL-MCNC: 425 MG/DL (ref 65–99)
HBA1C MFR BLD: 13.3 % (ref 4.8–5.6)
HCT VFR BLD AUTO: 38.8 % (ref 34–46.6)
HDLC SERPL-MCNC: 42 MG/DL (ref 40–60)
HGB BLD-MCNC: 12.2 G/DL (ref 12–15.9)
IMM GRANULOCYTES # BLD AUTO: 0.02 10*3/MM3 (ref 0–0.05)
IMM GRANULOCYTES NFR BLD AUTO: 0.3 % (ref 0–0.5)
LDLC SERPL CALC-MCNC: 173 MG/DL (ref 0–100)
LDLC/HDLC SERPL: 4.07 {RATIO}
LYMPHOCYTES # BLD AUTO: 0.98 10*3/MM3 (ref 0.7–3.1)
LYMPHOCYTES NFR BLD AUTO: 13.4 % (ref 19.6–45.3)
MCH RBC QN AUTO: 24.6 PG (ref 26.6–33)
MCHC RBC AUTO-ENTMCNC: 31.4 G/DL (ref 31.5–35.7)
MCV RBC AUTO: 78.4 FL (ref 79–97)
MONOCYTES # BLD AUTO: 0.59 10*3/MM3 (ref 0.1–0.9)
MONOCYTES NFR BLD AUTO: 8.1 % (ref 5–12)
NEUTROPHILS NFR BLD AUTO: 5.62 10*3/MM3 (ref 1.7–7)
NEUTROPHILS NFR BLD AUTO: 77.1 % (ref 42.7–76)
NRBC BLD AUTO-RTO: 0 /100 WBC (ref 0–0.2)
PLATELET # BLD AUTO: 340 10*3/MM3 (ref 140–450)
PMV BLD AUTO: 10.7 FL (ref 6–12)
POTASSIUM SERPL-SCNC: 4.6 MMOL/L (ref 3.5–5.2)
PROT SERPL-MCNC: 7.2 G/DL (ref 6–8.5)
RBC # BLD AUTO: 4.95 10*6/MM3 (ref 3.77–5.28)
SODIUM SERPL-SCNC: 134 MMOL/L (ref 136–145)
TRIGL SERPL-MCNC: 300 MG/DL (ref 0–150)
TSH SERPL DL<=0.05 MIU/L-ACNC: 1.25 UIU/ML (ref 0.27–4.2)
VIT B12 BLD-MCNC: 528 PG/ML (ref 211–946)
VLDLC SERPL-MCNC: 58 MG/DL (ref 5–40)
WBC # BLD AUTO: 7.29 10*3/MM3 (ref 3.4–10.8)

## 2021-01-26 PROCEDURE — 80061 LIPID PANEL: CPT

## 2021-01-26 PROCEDURE — 36415 COLL VENOUS BLD VENIPUNCTURE: CPT

## 2021-01-26 PROCEDURE — 84443 ASSAY THYROID STIM HORMONE: CPT

## 2021-01-26 PROCEDURE — 83036 HEMOGLOBIN GLYCOSYLATED A1C: CPT

## 2021-01-26 PROCEDURE — 85025 COMPLETE CBC W/AUTO DIFF WBC: CPT

## 2021-01-26 PROCEDURE — 80053 COMPREHEN METABOLIC PANEL: CPT

## 2021-01-26 PROCEDURE — 82306 VITAMIN D 25 HYDROXY: CPT

## 2021-01-26 PROCEDURE — 82607 VITAMIN B-12: CPT

## 2021-01-28 ENCOUNTER — OFFICE VISIT (OUTPATIENT)
Dept: ENDOCRINOLOGY | Facility: CLINIC | Age: 46
End: 2021-01-28

## 2021-01-28 VITALS
OXYGEN SATURATION: 99 % | HEIGHT: 61 IN | BODY MASS INDEX: 24.88 KG/M2 | WEIGHT: 131.8 LBS | HEART RATE: 85 BPM | SYSTOLIC BLOOD PRESSURE: 126 MMHG | DIASTOLIC BLOOD PRESSURE: 72 MMHG

## 2021-01-28 DIAGNOSIS — E11.42 DIABETIC POLYNEUROPATHY ASSOCIATED WITH TYPE 2 DIABETES MELLITUS (HCC): ICD-10-CM

## 2021-01-28 DIAGNOSIS — Z79.4 TYPE 2 DIABETES MELLITUS WITH HYPERGLYCEMIA, WITH LONG-TERM CURRENT USE OF INSULIN (HCC): Primary | ICD-10-CM

## 2021-01-28 DIAGNOSIS — E11.65 TYPE 2 DIABETES MELLITUS WITH HYPERGLYCEMIA, WITH LONG-TERM CURRENT USE OF INSULIN (HCC): Primary | ICD-10-CM

## 2021-01-28 DIAGNOSIS — E55.9 VITAMIN D DEFICIENCY: ICD-10-CM

## 2021-01-28 DIAGNOSIS — E78.2 MIXED HYPERLIPIDEMIA: ICD-10-CM

## 2021-01-28 PROCEDURE — 99214 OFFICE O/P EST MOD 30 MIN: CPT | Performed by: NURSE PRACTITIONER

## 2021-01-28 RX ORDER — ERGOCALCIFEROL 1.25 MG/1
50000 CAPSULE ORAL WEEKLY
Qty: 5 CAPSULE | Refills: 11 | Status: SHIPPED | OUTPATIENT
Start: 2021-01-28 | End: 2022-03-30

## 2021-01-28 RX ORDER — PEN NEEDLE, DIABETIC 29 G X1/2"
NEEDLE, DISPOSABLE MISCELLANEOUS
Qty: 120 EACH | Refills: 11 | Status: SHIPPED | OUTPATIENT
Start: 2021-01-28

## 2021-01-28 RX ORDER — ROSUVASTATIN CALCIUM 10 MG/1
10 TABLET, COATED ORAL NIGHTLY
Qty: 30 TABLET | Refills: 11 | Status: SHIPPED | OUTPATIENT
Start: 2021-01-28 | End: 2022-03-30 | Stop reason: ALTCHOICE

## 2021-01-28 NOTE — PATIENT INSTRUCTIONS
Toujeo taking  30 units --increase to 34 units      If you wake up with a blood glucose level less than 80 decrease by 4 units                Lyumjev --short actiing mealtime insulin        Taking 16 up to 20 units --increase to 24 units       Sliding scale       Blood sugar  Insulin       200-250 2 units   251-300 4 units   301-350 6 units   Above 351 8 units    ---

## 2021-01-28 NOTE — PROGRESS NOTES
"Chief Complaint  Diabetes    Subjective          Carol Perez presents to [facility] for   History of Present Illness     IN OFFICE VISIT     Referring provider Kieran Valle MD     45 year old female presents for follow up      Reason diabetes mellitus type 2    Duration diagnosed in 2017    Timing constant    Quality not controlled          Severity - high due to hyperglycemia         Severity (Complication/Hospitalizations)        Secondary Macrovascular--- none        Secondary Microvascular-- neuropathy         Context--presented with symptoms and lab work revealed she did have diabetes     She did have gestational diabetes            Diabetes Regimen--insulin              Blood Glucose Readings     Checking 2 times daily      This am 272     States 2  she is no longer running 300s         Diet-   Regular      Associated Signs/Symptoms         Hyperglycemic Symptoms: none         Hypoglycemic Episodes: none                      Objective   Vital Signs:   /72   Pulse 85   Ht 154.9 cm (61\")   Wt 59.8 kg (131 lb 12.8 oz)   SpO2 99%   BMI 24.90 kg/m²     Physical Exam  Constitutional:       Appearance: Normal appearance.   HENT:      Head: Normocephalic.      Right Ear: External ear normal.      Left Ear: External ear normal.   Eyes:      Conjunctiva/sclera: Conjunctivae normal.      Pupils: Pupils are equal, round, and reactive to light.   Neck:      Musculoskeletal: Normal range of motion and neck supple.   Cardiovascular:      Rate and Rhythm: Regular rhythm.      Heart sounds: Normal heart sounds.   Pulmonary:      Breath sounds: Normal breath sounds.   Musculoskeletal: Normal range of motion.   Skin:     Coloration: Skin is not pale.   Neurological:      General: No focal deficit present.      Mental Status: She is alert.   Psychiatric:         Mood and Affect: Mood normal.         Thought Content: Thought content normal.         Judgment: Judgment " normal.        Result Review :   The following data was reviewed by: TRACI Salazar on 01/28/2021:  CMP    CMP 10/27/20 1/26/21   BUN 7 11   Creatinine 0.56 (A) 0.72   eGFR Non African Am 118 88   Sodium 138 134 (A)   Potassium 4.4 4.6   Chloride 102 101   Calcium 9.4 9.3   Albumin 4.60 4.20   Total Bilirubin 0.3 0.2   Alkaline Phosphatase 91 90   AST (SGOT) 49 (A) 18   ALT (SGPT) 48 (A) 26   (A) Abnormal value            CBC    CBC 10/27/20 1/26/21   WBC 5.36 7.29   RBC 5.20 4.95   Hemoglobin 13.1 12.2   Hematocrit 40.4 38.8   MCV 77.7 (A) 78.4 (A)   MCH 25.2 (A) 24.6 (A)   MCHC 32.4 31.4 (A)   RDW 14.7 14.5   Platelets 348 340   (A) Abnormal value            Lipid Panel    Lipid Panel 10/27/20 1/26/21   Triglycerides 308 (A) 300 (A)   HDL Cholesterol 48 42   VLDL Cholesterol 61 (A) 58 (A)   LDL Cholesterol  199 (A) 173 (A)   LDL/HDL Ratio 4.13 4.07   (A) Abnormal value            TSH    TSH 10/27/20 1/26/21   TSH 2.300 1.250           A1C Last 3 Results    HGBA1C Last 3 Results 7/29/20 10/27/20 1/26/21   Hemoglobin A1C 14 13.30 (A) 13.30 (A)   (A) Abnormal value       Comments are available for some flowsheets but are not being displayed.           Microalbumin    Microalbumin 10/27/20   Microalbumin, Urine 2.0                     Assessment and Plan    Problem List Items Addressed This Visit        Other    Type 2 diabetes mellitus with hyperglycemia, with long-term current use of insulin (CMS/[facility]) - Primary    Relevant Orders    CBC & Differential    Comprehensive Metabolic Panel    Hemoglobin A1c    Lipid Panel    Microalbumin / Creatinine Urine Ratio - Urine, Clean Catch    Protein / Creatinine Ratio, Urine - Urine, Clean Catch    TSH    Vitamin B12    Vitamin D 25 Hydroxy    Diabetic polyneuropathy associated with type 2 diabetes mellitus (CMS/[facility])    Vitamin D deficiency    Relevant Orders    CBC & Differential    Comprehensive Metabolic Panel    Hemoglobin A1c    Lipid Panel    Microalbumin /  Creatinine Urine Ratio - Urine, Clean Catch    Protein / Creatinine Ratio, Urine - Urine, Clean Catch    TSH    Vitamin B12    Vitamin D 25 Hydroxy    Mixed hyperlipidemia    Relevant Medications    rosuvastatin (Crestor) 10 MG tablet    Other Relevant Orders    CBC & Differential    Comprehensive Metabolic Panel    Hemoglobin A1c    Lipid Panel    Microalbumin / Creatinine Urine Ratio - Urine, Clean Catch    Protein / Creatinine Ratio, Urine - Urine, Clean Catch    TSH    Vitamin B12    Vitamin D 25 Hydroxy      Glycemic Management     Diabetes mellitus type 2         Lab Results   Component Value Date    HGBA1C 13.30 (H) 01/26/2021            Toujeo taking  30 units --increase to 34 units      If you wake up with a blood glucose level less than 80 decrease by 4 units                Lyumjev --short actiing mealtime insulin        Taking 16 up to 20 units --increase to 24 units       Sliding scale       Blood sugar  Insulin       200-250 2 units   251-300 4 units   301-350 6 units   Above 351 8 units    ---        Goals for sugar     Fasting and before meals 80 to 130     2 hours after meals 180 or less        Aim for 45 grams of carbohydrate per meal     Aim for 15 grams of carbohydrate per snack           Previous regimen      Metformin stopped due to side effects         Novolin 70/30 units -- taking 55 units at night -she is not taking the daytime dosage--- stop today and change to the following                    Lipid Management              Hyperlipidemia       Taking  Crestor 10 mg one night         Blood Pressure Management     Not on ACEi              Microvascular Complication Monitoring        Last eye exam July 2020 , no DR            Component      Latest Ref Rng & Units 10/27/2020 10/27/2020          11:57 AM 11:57 AM   Protein/Creatinine Ratio      0.0 - 200.0 mg/G Crea 177.9     Creatinine, Urine      mg/dL 50.6 50.6   Total Protein, Urine      mg/dL 9.0     Microalbumin/Creatinine Ratio      mg/g    39.5   Microalbumin, Urine      mg/dL   2.0         Neuropathy -  Bilateral feet       Taking Elavil 10 mg 1 at night     Bone Health            vitamin d def.             TAKING  50,000 units once weekly      Other Diabetes Related Aspects    Lab Results   Component Value Date    [id number] 528 01/26/2021          Thyroid health        Lab Results   Component Value Date    TSH 1.250 01/26/2021                Follow Up   Return in about 3 months (around 4/28/2021) for Recheck.  Patient was given instructions and counseling regarding her condition or for health maintenance advice. Please see specific information pulled into the AVS if appropriate.

## 2021-02-24 ENCOUNTER — TELEPHONE (OUTPATIENT)
Dept: FAMILY MEDICINE CLINIC | Facility: CLINIC | Age: 46
End: 2021-02-24

## 2021-02-24 NOTE — TELEPHONE ENCOUNTER
Patient called nanoing Daniela to call her back, did not give any other information.    Good call back   [phone]

## 2021-02-25 ENCOUNTER — OFFICE VISIT (OUTPATIENT)
Dept: FAMILY MEDICINE CLINIC | Facility: CLINIC | Age: 46
End: 2021-02-25

## 2021-02-25 VITALS
HEART RATE: 101 BPM | HEIGHT: 61 IN | BODY MASS INDEX: 25 KG/M2 | DIASTOLIC BLOOD PRESSURE: 68 MMHG | WEIGHT: 132.4 LBS | TEMPERATURE: 96.8 F | SYSTOLIC BLOOD PRESSURE: 118 MMHG | OXYGEN SATURATION: 98 %

## 2021-02-25 DIAGNOSIS — R23.2 HOT FLASHES: ICD-10-CM

## 2021-02-25 DIAGNOSIS — N92.6 MENSTRUAL PERIODS IRREGULAR: Primary | ICD-10-CM

## 2021-02-25 PROCEDURE — 99213 OFFICE O/P EST LOW 20 MIN: CPT | Performed by: NURSE PRACTITIONER

## 2021-02-25 NOTE — PROGRESS NOTES
Subjective   Carol Perez is a 45 y.o. female. Menstrual problem.    History of Present Illness   Patient presents today for a menstrual problem. She says she had menstrual period two weeks ago and started another period a few days ago. Reports moderate bleeding with both periods. Pt also experiencing hot flashes. Denies any vaginal pain, vaginal dryness, pelvic pain, or dyspareunia. No history of abnormal pap smears. Non OB transvaginal ultrasound on 2/1/2021 was normal. Pt reports that her mother went through menopause in her 40's.    The following portions of the patient's history were reviewed and updated as appropriate: allergies, current medications, past family history, past medical history, past social history, past surgical history, and problem list.    Review of Systems   Constitutional: Negative for chills, fatigue and fever.   HENT: Negative for congestion, ear pain, rhinorrhea and sore throat.    Eyes: Negative for blurred vision, double vision and visual disturbance.   Respiratory: Negative for cough, chest tightness, shortness of breath and wheezing.    Cardiovascular: Negative for chest pain, palpitations and leg swelling.   Gastrointestinal: Negative for abdominal pain, diarrhea, nausea and vomiting.   Endocrine: Positive for heat intolerance (hot flashes). Negative for cold intolerance.   Genitourinary: Positive for menstrual problem. Negative for difficulty urinating, dysuria, frequency, hematuria, vaginal bleeding, vaginal discharge and vaginal pain.   Musculoskeletal: Negative for arthralgias, back pain, neck pain and neck stiffness.   Skin: Negative for dry skin, pallor, rash, skin lesions and bruise.   Allergic/Immunologic: Negative for environmental allergies, food allergies and immunocompromised state.   Neurological: Negative for dizziness, syncope, weakness, light-headedness, headache and confusion.   Hematological: Negative for adenopathy. Does not bruise/bleed easily.    Psychiatric/Behavioral: Negative for self-injury, sleep disturbance, suicidal ideas, depressed mood and stress. The patient is not nervous/anxious.        Vitals:    02/25/21 0902   BP: 118/68   Pulse: 101   Temp: 96.8 °F (36 °C)   SpO2: 98%     Body mass index is 25.02 kg/m².    Objective   Physical Exam  Vitals signs and nursing note reviewed.   Constitutional:       General: She is not in acute distress.     Appearance: She is well-developed. She is not ill-appearing.   HENT:      Head: Normocephalic.   Eyes:      Conjunctiva/sclera: Conjunctivae normal.   Neck:      Musculoskeletal: Full passive range of motion without pain, normal range of motion and neck supple.      Thyroid: No thyroid mass, thyromegaly or thyroid tenderness.   Cardiovascular:      Rate and Rhythm: Normal rate and regular rhythm.      Heart sounds: Normal heart sounds, S1 normal and S2 normal. No murmur.   Pulmonary:      Effort: Pulmonary effort is normal.      Breath sounds: Normal breath sounds and air entry. No decreased breath sounds, wheezing, rhonchi or rales.   Abdominal:      General: Abdomen is flat. Bowel sounds are normal.      Palpations: Abdomen is soft.      Tenderness: There is no abdominal tenderness.   Musculoskeletal: Normal range of motion.   Skin:     General: Skin is warm and dry.   Neurological:      General: No focal deficit present.      Mental Status: She is alert and oriented to person, place, and time.      Coordination: Coordination is intact. Coordination normal.      Gait: Gait is intact. Gait normal.   Psychiatric:         Attention and Perception: Attention normal.         Mood and Affect: Mood normal.         Speech: Speech normal.         Behavior: Behavior normal. Behavior is cooperative.         Thought Content: Thought content normal.         Cognition and Memory: Cognition normal.         Judgment: Judgment normal.           Assessment/Plan   Diagnoses and all orders for this visit:    1. Menstrual  periods irregular (Primary)  -     Follicle Stimulating Hormone; Future    Pt's symptoms most likely from perimenopause. Offered OCP to help regulate cycle and hot flashes, pt would like to wait and see if symptoms improve without medication. Ordering FSH level to see if it indicates perimenopause.   Pt due for pap smear, recommended she get that scheduled soon.  If symptoms do not improve or worsen, patient was instructed to return to clinic for further evaluation.         This document has been electronically signed by TRACI Young on  February 25, 2021 09:39 CST

## 2021-12-29 ENCOUNTER — OFFICE VISIT (OUTPATIENT)
Dept: FAMILY MEDICINE CLINIC | Facility: CLINIC | Age: 46
End: 2021-12-29

## 2021-12-29 DIAGNOSIS — F43.21 GRIEF: ICD-10-CM

## 2021-12-29 DIAGNOSIS — Z79.4 TYPE 2 DIABETES MELLITUS WITH HYPERGLYCEMIA, WITH LONG-TERM CURRENT USE OF INSULIN (HCC): Primary | ICD-10-CM

## 2021-12-29 DIAGNOSIS — R41.3 MEMORY DIFFICULTY: ICD-10-CM

## 2021-12-29 DIAGNOSIS — E11.65 TYPE 2 DIABETES MELLITUS WITH HYPERGLYCEMIA, WITH LONG-TERM CURRENT USE OF INSULIN (HCC): Primary | ICD-10-CM

## 2021-12-29 PROCEDURE — 99214 OFFICE O/P EST MOD 30 MIN: CPT | Performed by: FAMILY MEDICINE

## 2021-12-29 RX ORDER — BLOOD SUGAR DIAGNOSTIC
1 STRIP MISCELLANEOUS 4 TIMES DAILY
Qty: 200 EACH | Refills: 12 | Status: SHIPPED | OUTPATIENT
Start: 2021-12-29

## 2021-12-29 NOTE — PROGRESS NOTES
Subjective   Carol Perez is a 46 y.o. female.     No chief complaint on file.            History of Present Illness     Oct 27 went to hospital with covid, intubated, 19 days in hospital.  Out .   of covid .  She cant remember anything,  Not monitoring her fsbs for her diabetes.  Last hga1c 13-14.    Review of Systems   Neurological: Positive for weakness.   Psychiatric/Behavioral: Positive for sleep disturbance.           There were no vitals taken for this visit.      Objective     Physical Exam  Vitals and nursing note reviewed.   Constitutional:       Appearance: Normal appearance. She is well-developed.   HENT:      Head: Normocephalic and atraumatic.      Right Ear: External ear normal.      Left Ear: External ear normal.      Nose: Nose normal.   Eyes:      Extraocular Movements: Extraocular movements intact.      Conjunctiva/sclera: Conjunctivae normal.      Pupils: Pupils are equal, round, and reactive to light.   Pulmonary:      Effort: Pulmonary effort is normal.   Musculoskeletal:         General: Normal range of motion.      Cervical back: Normal range of motion.   Neurological:      General: No focal deficit present.      Mental Status: She is alert and oriented to person, place, and time.   Psychiatric:         Behavior: Behavior normal.         Thought Content: Thought content normal.         Judgment: Judgment normal.             PAST MEDICAL HISTORY     Past Medical History:   Diagnosis Date   • Encounter for insertion of intrauterine contraceptive device      replace mirena      • History of female sterilization    • IUD check up    • Pain in pelvis    • Surgical follow-up care       PAST SURGICAL HISTORY     Past Surgical History:   Procedure Laterality Date   • HYSTEROSCOPY TUBAL STERILIZATION  10/22/2014    Essure tubal sterilization precedure.   • INJECTION OF MEDICATION  2014    Dep Provera   • PAP SMEAR  03/10/2008    neg   • VAGINAL DELIVERY   "04/05/2008      SOCIAL HISTORY     Social History     Socioeconomic History   • Marital status:    Tobacco Use   • Smoking status: Never Smoker   • Smokeless tobacco: Never Used   Substance and Sexual Activity   • Alcohol use: No   • Drug use: No   • Sexual activity: Defer      ALLERGIES   Patient has no known allergies.   MEDICATIONS     Current Outpatient Medications   Medication Sig Dispense Refill   • amitriptyline (ELAVIL) 10 MG tablet Take 10 mg by mouth Every Night.     • fluconazole (Diflucan) 150 MG tablet One tablet every 3 days for 3 doses 3 tablet 0   • glucose blood (FREESTYLE TEST STRIPS) test strip 1 each by Other route 2 (Two) Times a Day As Needed (sugar). 100 each 12   • Insulin Glargine, 1 Unit Dial, (Toujeo SoloStar) 300 UNIT/ML solution pen-injector injection Take 30 units once daily 3 pen 11   • Insulin Lispro-aabc, 1 U Dial, (Debbieumerica KwikPen) 100 UNIT/ML solution pen-injector Inject  under the skin into the appropriate area as directed. 6 un with meals     • Insulin Pen Needle (AboutTime Pen Needle) 32G X 4 MM misc Inject 4 times daily 120 each 11   • Insulin Syringe 30G X 5/16\" 1 ML misc 1 each 2 (Two) Times a Day. 100 each 11   • Lancets (FREESTYLE) lancets 1 each by Other route 2 (Two) Times a Day As Needed (sugar). 100 each 12   • rosuvastatin (Crestor) 10 MG tablet Take 1 tablet by mouth Every Night. 30 tablet 11   • vitamin D (ERGOCALCIFEROL) 1.25 MG (83217 UT) capsule capsule Take 1 capsule by mouth 1 (One) Time Per Week. 5 capsule 11     No current facility-administered medications for this visit.        The following portions of the patient's history were reviewed and updated as appropriate: allergies, current medications, past family history, past medical history, past social history, past surgical history and problem list.        Assessment/Plan   Diagnoses and all orders for this visit:    1. Type 2 diabetes mellitus with hyperglycemia, with long-term current use of insulin " (HCC) (Primary)    2. Memory difficulty    3. Grief    Other orders  -     glucose blood (FREESTYLE TEST STRIPS) test strip; 1 each by Other route 4 (Four) Times a Day.  Dispense: 200 each; Refill: 12  -     sertraline (Zoloft) 50 MG tablet; Take 0.5-1 tablets by mouth Daily.  Dispense: 30 tablet; Refill: 11    check her fsbs qid and follow up Monday and will restart insulin    zoloft for grief, some ptsd possible.     Memory while in hospital due to intubation and medications.  May have some brain fog due to covid and uncontrolled diabetes.                        No follow-ups on file.                  This document has been electronically signed by Kieran Valle MD on December 29, 2021 10:57 CST

## 2022-03-22 ENCOUNTER — TELEPHONE (OUTPATIENT)
Dept: FAMILY MEDICINE CLINIC | Facility: CLINIC | Age: 47
End: 2022-03-22

## 2022-03-22 DIAGNOSIS — E11.65 TYPE 2 DIABETES MELLITUS WITH HYPERGLYCEMIA, WITH LONG-TERM CURRENT USE OF INSULIN: Primary | ICD-10-CM

## 2022-03-22 DIAGNOSIS — Z79.4 TYPE 2 DIABETES MELLITUS WITH HYPERGLYCEMIA, WITH LONG-TERM CURRENT USE OF INSULIN: Primary | ICD-10-CM

## 2022-03-22 RX ORDER — INSULIN GLARGINE 300 U/ML
INJECTION, SOLUTION SUBCUTANEOUS
Qty: 3 PEN | Refills: 1 | Status: SHIPPED | OUTPATIENT
Start: 2022-03-22 | End: 2022-03-24 | Stop reason: CLARIF

## 2022-03-22 RX ORDER — INSULIN LISPRO-AABC 100 [IU]/ML
6 INJECTION, SOLUTION SUBCUTANEOUS
Qty: 6 ML | Refills: 1 | Status: SHIPPED | OUTPATIENT
Start: 2022-03-22 | End: 2022-03-28 | Stop reason: SDUPTHER

## 2022-03-28 ENCOUNTER — OFFICE VISIT (OUTPATIENT)
Dept: FAMILY MEDICINE CLINIC | Facility: CLINIC | Age: 47
End: 2022-03-28

## 2022-03-28 ENCOUNTER — LAB (OUTPATIENT)
Dept: LAB | Facility: HOSPITAL | Age: 47
End: 2022-03-28

## 2022-03-28 VITALS
HEART RATE: 101 BPM | DIASTOLIC BLOOD PRESSURE: 72 MMHG | HEIGHT: 61 IN | TEMPERATURE: 98 F | SYSTOLIC BLOOD PRESSURE: 122 MMHG | BODY MASS INDEX: 23.71 KG/M2 | WEIGHT: 125.6 LBS | OXYGEN SATURATION: 98 %

## 2022-03-28 DIAGNOSIS — Z79.4 TYPE 2 DIABETES MELLITUS WITH HYPERGLYCEMIA, WITH LONG-TERM CURRENT USE OF INSULIN: ICD-10-CM

## 2022-03-28 DIAGNOSIS — E55.9 VITAMIN D DEFICIENCY: ICD-10-CM

## 2022-03-28 DIAGNOSIS — Z00.00 ANNUAL PHYSICAL EXAM: Primary | ICD-10-CM

## 2022-03-28 DIAGNOSIS — Z00.00 GENERAL MEDICAL EXAM: ICD-10-CM

## 2022-03-28 DIAGNOSIS — E11.65 TYPE 2 DIABETES MELLITUS WITH HYPERGLYCEMIA, WITH LONG-TERM CURRENT USE OF INSULIN: ICD-10-CM

## 2022-03-28 DIAGNOSIS — F43.21 GRIEF: ICD-10-CM

## 2022-03-28 PROCEDURE — 99214 OFFICE O/P EST MOD 30 MIN: CPT | Performed by: NURSE PRACTITIONER

## 2022-03-28 PROCEDURE — 80050 GENERAL HEALTH PANEL: CPT | Performed by: NURSE PRACTITIONER

## 2022-03-28 PROCEDURE — 80061 LIPID PANEL: CPT | Performed by: NURSE PRACTITIONER

## 2022-03-28 PROCEDURE — 83036 HEMOGLOBIN GLYCOSYLATED A1C: CPT | Performed by: NURSE PRACTITIONER

## 2022-03-28 PROCEDURE — 82306 VITAMIN D 25 HYDROXY: CPT | Performed by: NURSE PRACTITIONER

## 2022-03-28 RX ORDER — INSULIN LISPRO-AABC 100 [IU]/ML
6 INJECTION, SOLUTION SUBCUTANEOUS
Qty: 9 ML | Refills: 1 | Status: SHIPPED | OUTPATIENT
Start: 2022-03-28 | End: 2022-03-30 | Stop reason: ALTCHOICE

## 2022-03-28 NOTE — PROGRESS NOTES
Subjective   Carol Perez is a 46 y.o. female. Diabetes    History of Present Illness   Patient presents today for diabetes. She says she has not had any diabetic medication since November 2021 due to not having health insurance. Recently she checked her blood sugar and says it was close to 400. Reports that she moved to [place]. Her and her  had COVID 19. She was hospitalized and placed on ventilator. While she was sedated her  passed away at their home from COVID 19. Pt says zoloft helps some with her grief. Denies any suicidal thoughts or actions.    The following portions of the patient's history were reviewed and updated as appropriate: allergies, current medications, past family history, past medical history, past social history, past surgical history, and problem list.    Review of Systems   Constitutional: Negative for chills, fatigue and fever.   HENT: Negative for congestion, ear pain, rhinorrhea and sore throat.    Eyes: Negative for blurred vision, double vision and visual disturbance.   Respiratory: Negative for cough, chest tightness, shortness of breath and wheezing.    Cardiovascular: Negative for chest pain, palpitations and leg swelling.   Gastrointestinal: Negative for abdominal pain, diarrhea, nausea and vomiting.   Endocrine: Negative for cold intolerance and heat intolerance.   Genitourinary: Negative for difficulty urinating, dysuria, frequency and hematuria.   Musculoskeletal: Negative for arthralgias, back pain, neck pain and neck stiffness.   Skin: Negative for dry skin, pallor, rash, skin lesions and wound.   Allergic/Immunologic: Negative for environmental allergies, food allergies and immunocompromised state.   Neurological: Negative for dizziness, syncope, weakness, light-headedness, headache and confusion.   Hematological: Negative for adenopathy. Does not bruise/bleed easily.   Psychiatric/Behavioral: Positive for dysphoric mood. Negative for self-injury,  sleep disturbance, suicidal ideas, depressed mood and stress. The patient is not nervous/anxious.        Vitals:    03/28/22 1303   BP: 122/72   Pulse: 101   Temp: 98 °F (36.7 °C)   SpO2: 98%     Body mass index is 23.73 kg/m².    Objective   Physical Exam  Vitals reviewed.   Constitutional:       General: She is not in acute distress.     Appearance: Normal appearance. She is well-developed. She is not ill-appearing.   HENT:      Head: Normocephalic.      Right Ear: Hearing, tympanic membrane, ear canal and external ear normal.      Left Ear: Hearing, tympanic membrane, ear canal and external ear normal.      Nose: Nose normal.      Mouth/Throat:      Lips: Pink.      Mouth: Mucous membranes are moist.      Pharynx: Oropharynx is clear. Uvula midline. No oropharyngeal exudate.      Tonsils: No tonsillar exudate or tonsillar abscesses. 1+ on the right. 1+ on the left.   Eyes:      General: Lids are normal. Gaze aligned appropriately.      Conjunctiva/sclera: Conjunctivae normal.      Pupils: Pupils are equal, round, and reactive to light.   Neck:      Thyroid: No thyroid mass, thyromegaly or thyroid tenderness.   Cardiovascular:      Rate and Rhythm: Normal rate and regular rhythm.   Pulmonary:      Effort: Pulmonary effort is normal.      Breath sounds: Normal breath sounds and air entry.   Abdominal:      General: Abdomen is flat. Bowel sounds are normal.      Palpations: Abdomen is soft.      Tenderness: There is no abdominal tenderness.   Musculoskeletal:         General: Normal range of motion.      Cervical back: Full passive range of motion without pain, normal range of motion and neck supple.   Lymphadenopathy:      Cervical: No cervical adenopathy.   Skin:     General: Skin is warm and dry.   Neurological:      General: No focal deficit present.      Mental Status: She is alert and oriented to person, place, and time.      Coordination: Coordination is intact.      Gait: Gait is intact.   Psychiatric:          Attention and Perception: Attention normal.         Mood and Affect: Mood normal.         Speech: Speech normal.         Behavior: Behavior normal. Behavior is cooperative.         Thought Content: Thought content normal.           Assessment/Plan   Diagnoses and all orders for this visit:    1. Annual physical exam (Primary)    2. General medical exam  -     CBC (No Diff)  -     Comprehensive metabolic panel  -     TSH  -     Lipid Panel    3. Type 2 diabetes mellitus with hyperglycemia, with long-term current use of insulin (HCC)  -     Hemoglobin A1c  -     Insulin Glargine (LANTUS SOLOSTAR) 100 UNIT/ML injection pen; Inject 15 Units under the skin into the appropriate area as directed Daily. Adjust per sliding scale.  Dispense: 9 pen; Refill: 0  -     Insulin Lispro-aabc, 1 U Dial, (Lyumjev KwikPen) 100 UNIT/ML solution pen-injector; Inject 6 Units under the skin into the appropriate area as directed 3 (Three) Times a Day Before Meals. 6 un with meals  Dispense: 9 mL; Refill: 1    4. Vitamin D deficiency  -     Vitamin D 25 hydroxy    5. Grief  -     sertraline (Zoloft) 50 MG tablet; Take 0.5-1 tablets by mouth Daily.  Dispense: 90 tablet; Refill: 3    Physical exam unremarkable.  Labs ordered for general medical exam and diabetes.  Discussed importance of diet and exercise for glycemic control.  Pt instructed to use lantus 15 units daily and adjust per sliding scale and lyumjev 6 units under skin TID before meals.  Keep blood sugar diary, return in one week for follow up.  Refilled zoloft, pt denies any suicidal thoughts or actions.  If symptoms do not improve or worsen, patient was instructed to return to clinic for further evaluation.         This document has been electronically signed by TRACI Young on  March 28, 2022 15:36 CDT

## 2022-03-29 LAB
25(OH)D3 SERPL-MCNC: 6.4 NG/ML (ref 30–100)
ALBUMIN SERPL-MCNC: 4.4 G/DL (ref 3.5–5.2)
ALBUMIN/GLOB SERPL: 1.8 G/DL
ALP SERPL-CCNC: 98 U/L (ref 39–117)
ALT SERPL W P-5'-P-CCNC: 19 U/L (ref 1–33)
ANION GAP SERPL CALCULATED.3IONS-SCNC: 14.7 MMOL/L (ref 5–15)
AST SERPL-CCNC: 14 U/L (ref 1–32)
BILIRUB SERPL-MCNC: 0.2 MG/DL (ref 0–1.2)
BUN SERPL-MCNC: 8 MG/DL (ref 6–20)
BUN/CREAT SERPL: 9.1 (ref 7–25)
CALCIUM SPEC-SCNC: 9.2 MG/DL (ref 8.6–10.5)
CHLORIDE SERPL-SCNC: 99 MMOL/L (ref 98–107)
CHOLEST SERPL-MCNC: 320 MG/DL (ref 0–200)
CO2 SERPL-SCNC: 21.3 MMOL/L (ref 22–29)
CREAT SERPL-MCNC: 0.88 MG/DL (ref 0.57–1)
DEPRECATED RDW RBC AUTO: 42.6 FL (ref 37–54)
EGFRCR SERPLBLD CKD-EPI 2021: 82.7 ML/MIN/1.73
ERYTHROCYTE [DISTWIDTH] IN BLOOD BY AUTOMATED COUNT: 14.1 % (ref 12.3–15.4)
GLOBULIN UR ELPH-MCNC: 2.4 GM/DL
GLUCOSE SERPL-MCNC: 623 MG/DL (ref 65–99)
HBA1C MFR BLD: 14.6 % (ref 4.8–5.6)
HCT VFR BLD AUTO: 40 % (ref 34–46.6)
HDLC SERPL-MCNC: 44 MG/DL (ref 40–60)
HGB BLD-MCNC: 12.1 G/DL (ref 12–15.9)
LDLC SERPL CALC-MCNC: 206 MG/DL (ref 0–100)
LDLC/HDLC SERPL: 4.72 {RATIO}
MCH RBC QN AUTO: 25.1 PG (ref 26.6–33)
MCHC RBC AUTO-ENTMCNC: 30.3 G/DL (ref 31.5–35.7)
MCV RBC AUTO: 83 FL (ref 79–97)
PLATELET # BLD AUTO: 341 10*3/MM3 (ref 140–450)
PMV BLD AUTO: 11.2 FL (ref 6–12)
POTASSIUM SERPL-SCNC: 4.3 MMOL/L (ref 3.5–5.2)
PROT SERPL-MCNC: 6.8 G/DL (ref 6–8.5)
RBC # BLD AUTO: 4.82 10*6/MM3 (ref 3.77–5.28)
SODIUM SERPL-SCNC: 135 MMOL/L (ref 136–145)
TRIGL SERPL-MCNC: 341 MG/DL (ref 0–150)
TSH SERPL DL<=0.05 MIU/L-ACNC: 1.29 UIU/ML (ref 0.27–4.2)
VLDLC SERPL-MCNC: 70 MG/DL (ref 5–40)
WBC NRBC COR # BLD: 5.67 10*3/MM3 (ref 3.4–10.8)

## 2022-03-30 DIAGNOSIS — E78.2 MIXED HYPERLIPIDEMIA: ICD-10-CM

## 2022-03-30 DIAGNOSIS — E11.65 TYPE 2 DIABETES MELLITUS WITH HYPERGLYCEMIA, WITH LONG-TERM CURRENT USE OF INSULIN: Primary | ICD-10-CM

## 2022-03-30 DIAGNOSIS — Z79.4 TYPE 2 DIABETES MELLITUS WITH HYPERGLYCEMIA, WITH LONG-TERM CURRENT USE OF INSULIN: Primary | ICD-10-CM

## 2022-03-30 DIAGNOSIS — E55.9 VITAMIN D DEFICIENCY: Primary | ICD-10-CM

## 2022-03-30 RX ORDER — PRAVASTATIN SODIUM 40 MG
40 TABLET ORAL NIGHTLY
Qty: 90 TABLET | Refills: 1 | Status: SHIPPED | OUTPATIENT
Start: 2022-03-30

## 2022-03-30 RX ORDER — ERGOCALCIFEROL 1.25 MG/1
50000 CAPSULE ORAL WEEKLY
Qty: 12 CAPSULE | Refills: 3 | Status: SHIPPED | OUTPATIENT
Start: 2022-03-30

## 2022-03-30 RX ORDER — INSULIN LISPRO 100 [IU]/ML
6 INJECTION, SOLUTION INTRAVENOUS; SUBCUTANEOUS
Qty: 9 ML | Refills: 0 | Status: SHIPPED | OUTPATIENT
Start: 2022-03-30

## 2022-04-19 ENCOUNTER — OFFICE VISIT (OUTPATIENT)
Dept: FAMILY MEDICINE CLINIC | Facility: CLINIC | Age: 47
End: 2022-04-19

## 2022-04-19 VITALS
DIASTOLIC BLOOD PRESSURE: 75 MMHG | BODY MASS INDEX: 24.07 KG/M2 | HEART RATE: 97 BPM | SYSTOLIC BLOOD PRESSURE: 119 MMHG | TEMPERATURE: 98.2 F | WEIGHT: 127.5 LBS | HEIGHT: 61 IN | OXYGEN SATURATION: 98 %

## 2022-04-19 DIAGNOSIS — M53.3 COCCYX PAIN: Primary | ICD-10-CM

## 2022-04-19 DIAGNOSIS — B37.2 CANDIDAL INTERTRIGO: ICD-10-CM

## 2022-04-19 DIAGNOSIS — N89.8 VAGINAL ITCHING: ICD-10-CM

## 2022-04-19 PROCEDURE — 99213 OFFICE O/P EST LOW 20 MIN: CPT | Performed by: NURSE PRACTITIONER

## 2022-04-19 RX ORDER — FLUCONAZOLE 150 MG/1
TABLET ORAL
Qty: 2 TABLET | Refills: 0 | Status: SHIPPED | OUTPATIENT
Start: 2022-04-19 | End: 2022-06-03

## 2022-04-19 RX ORDER — NYSTATIN 100000 [USP'U]/G
POWDER TOPICAL 3 TIMES DAILY
Qty: 60 G | Refills: 0 | Status: SHIPPED | OUTPATIENT
Start: 2022-04-19 | End: 2022-06-03

## 2022-04-19 NOTE — PROGRESS NOTES
Subjective   Carol Perez is a 46 y.o. female. Tailbone pain    History of Present Illness   Patient presents today for tailbone pain. She says she has been having this pain since October 2021 when she was hospitalized for COVID 19. Pt says she was on ventilator and spent 19 days in the hospital. She says her tail bone pain is constant. Pain worsened with sitting. She also reports some vaginal itching and discharge. Reports being susceptible to yeast infection when blood sugar is high. Pt is due for diabetes visit. She is unsure how her blood sugar has been running because she says she hasn't checked it.     The following portions of the patient's history were reviewed and updated as appropriate: allergies, current medications, past family history, past medical history, past social history, past surgical history, and problem list.    Review of Systems   Constitutional: Negative for chills, fatigue and fever.   HENT: Negative for congestion, ear pain, rhinorrhea and sore throat.    Eyes: Negative for blurred vision, double vision and visual disturbance.   Respiratory: Negative for cough, chest tightness, shortness of breath and wheezing.    Cardiovascular: Negative for chest pain, palpitations and leg swelling.   Gastrointestinal: Negative for abdominal pain, diarrhea, nausea and vomiting.   Endocrine: Negative for cold intolerance and heat intolerance.   Genitourinary: Positive for vaginal discharge and vaginal pain (itching). Negative for difficulty urinating, dysuria, frequency, hematuria and vaginal bleeding.   Musculoskeletal: Positive for arthralgias (tailbone pain). Negative for back pain, neck pain and neck stiffness.   Skin: Negative for dry skin, pallor, rash, skin lesions and wound.   Allergic/Immunologic: Negative for environmental allergies, food allergies and immunocompromised state.   Neurological: Negative for dizziness, syncope, weakness, light-headedness, headache and confusion.  "  Hematological: Negative for adenopathy. Does not bruise/bleed easily.   Psychiatric/Behavioral: Negative for self-injury, sleep disturbance, suicidal ideas, depressed mood and stress. The patient is not nervous/anxious.        Vitals:    04/19/22 1444   BP: 119/75   Pulse: 97   Temp: 98.2 °F (36.8 °C)   SpO2: 98%     Body mass index is 24.09 kg/m².    Objective   Physical Exam  Constitutional:       General: She is not in acute distress.     Appearance: She is well-developed. She is not ill-appearing or diaphoretic.   HENT:      Head: Normocephalic.   Eyes:      General: Lids are normal.      Conjunctiva/sclera: Conjunctivae normal.      Pupils: Pupils are equal, round, and reactive to light.   Musculoskeletal:         General: Normal range of motion.      Cervical back: Full passive range of motion without pain, normal range of motion and neck supple.   Skin:     General: Skin is warm and dry.      Coloration: Skin is not pale.      Findings: Erythema present.          Neurological:      Mental Status: She is alert and oriented to person, place, and time.      Coordination: Coordination normal.      Gait: Gait normal.   Psychiatric:         Speech: Speech normal.         Behavior: Behavior normal. Behavior is cooperative.         Thought Content: Thought content normal.         Judgment: Judgment normal.           Assessment/Plan   Diagnoses and all orders for this visit:    1. Coccyx pain (Primary)  -     XR Sacrum & Coccyx (In Office)    2. Candidal intertrigo  -     nystatin (MYCOSTATIN) 905440 UNIT/GM powder; Apply  topically to the appropriate area as directed 3 (Three) Times a Day.  Dispense: 60 g; Refill: 0    3. Vaginal itching  -     fluconazole (Diflucan) 150 MG tablet; Take 1 tablet now and second tablet in 4 days.  Dispense: 2 tablet; Refill: 0    Xray's ordered to further evaluate coccyx pain. Recommended \"doughnut\" pillow to keep pressure off of coccyx area. Will call pt with xray results.  Candidal " intertrigo noted in crease of buttocks. Pt instructed to keep area clean/dry. Apply nystatin powder as directed.  Symptoms of vaginal itching and discharge consistent with candida vaginitis. Take diflucan as directed.  Discussed importance of glycemic control. Pt instructed to keep blood sugar diary and follow up in 1 week. If blood sugar remains high it makes her more susceptible to infections.   If symptoms do not improve or worsen, patient was instructed to return to clinic for further evaluation.         This document has been electronically signed by TRACI Young on  April 21, 2022 09:00 CDT

## 2022-06-03 ENCOUNTER — TELEPHONE (OUTPATIENT)
Dept: ENDOCRINOLOGY | Facility: CLINIC | Age: 47
End: 2022-06-03

## 2022-06-03 ENCOUNTER — HOSPITAL ENCOUNTER (EMERGENCY)
Facility: HOSPITAL | Age: 47
Discharge: HOME | End: 2022-06-03
Attending: FAMILY MEDICINE | Admitting: FAMILY MEDICINE

## 2022-06-03 VITALS
HEART RATE: 78 BPM | DIASTOLIC BLOOD PRESSURE: 74 MMHG | OXYGEN SATURATION: 98 % | HEIGHT: 61 IN | SYSTOLIC BLOOD PRESSURE: 114 MMHG | WEIGHT: 126 LBS | RESPIRATION RATE: 20 BRPM | TEMPERATURE: 98.1 F | BODY MASS INDEX: 23.79 KG/M2

## 2022-06-03 DIAGNOSIS — E11.65 TYPE 2 DIABETES MELLITUS WITH HYPERGLYCEMIA, WITHOUT LONG-TERM CURRENT USE OF INSULIN: Primary | ICD-10-CM

## 2022-06-03 LAB
ALBUMIN SERPL-MCNC: 4.5 G/DL (ref 3.5–5.2)
ALBUMIN/GLOB SERPL: 1.7 G/DL
ALP SERPL-CCNC: 98 U/L (ref 39–117)
ALT SERPL W P-5'-P-CCNC: 26 U/L (ref 1–33)
ANION GAP SERPL CALCULATED.3IONS-SCNC: 15 MMOL/L (ref 5–15)
AST SERPL-CCNC: 28 U/L (ref 1–32)
BACTERIA UR QL AUTO: ABNORMAL /HPF
BASOPHILS # BLD AUTO: 0.07 10*3/MM3 (ref 0–0.2)
BASOPHILS NFR BLD AUTO: 1 % (ref 0–1.5)
BILIRUB SERPL-MCNC: <0.2 MG/DL (ref 0–1.2)
BILIRUB UR QL STRIP: NEGATIVE
BUN SERPL-MCNC: 9 MG/DL (ref 6–20)
BUN/CREAT SERPL: 16.4 (ref 7–25)
CALCIUM SPEC-SCNC: 10.1 MG/DL (ref 8.6–10.5)
CHLORIDE SERPL-SCNC: 104 MMOL/L (ref 98–107)
CLARITY UR: ABNORMAL
CO2 SERPL-SCNC: 21 MMOL/L (ref 22–29)
COLOR UR: YELLOW
CREAT SERPL-MCNC: 0.55 MG/DL (ref 0.57–1)
DEPRECATED RDW RBC AUTO: 39.9 FL (ref 37–54)
EGFRCR SERPLBLD CKD-EPI 2021: 114.6 ML/MIN/1.73
EOSINOPHIL # BLD AUTO: 0.06 10*3/MM3 (ref 0–0.4)
EOSINOPHIL NFR BLD AUTO: 0.9 % (ref 0.3–6.2)
ERYTHROCYTE [DISTWIDTH] IN BLOOD BY AUTOMATED COUNT: 15 % (ref 12.3–15.4)
FLUAV RNA RESP QL NAA+PROBE: NOT DETECTED
FLUBV RNA RESP QL NAA+PROBE: NOT DETECTED
GLOBULIN UR ELPH-MCNC: 2.7 GM/DL
GLUCOSE SERPL-MCNC: 242 MG/DL (ref 65–99)
GLUCOSE UR STRIP-MCNC: ABNORMAL MG/DL
HBA1C MFR BLD: 15.4 % (ref 4.8–5.6)
HCT VFR BLD AUTO: 37.7 % (ref 34–46.6)
HGB BLD-MCNC: 12.1 G/DL (ref 12–15.9)
HGB UR QL STRIP.AUTO: ABNORMAL
HOLD SPECIMEN: NORMAL
HYALINE CASTS UR QL AUTO: ABNORMAL /LPF
IMM GRANULOCYTES # BLD AUTO: 0.04 10*3/MM3 (ref 0–0.05)
IMM GRANULOCYTES NFR BLD AUTO: 0.6 % (ref 0–0.5)
KETONES UR QL STRIP: NEGATIVE
LEUKOCYTE ESTERASE UR QL STRIP.AUTO: NEGATIVE
LYMPHOCYTES # BLD AUTO: 1.25 10*3/MM3 (ref 0.7–3.1)
LYMPHOCYTES NFR BLD AUTO: 18.6 % (ref 19.6–45.3)
MCH RBC QN AUTO: 23.8 PG (ref 26.6–33)
MCHC RBC AUTO-ENTMCNC: 32.1 G/DL (ref 31.5–35.7)
MCV RBC AUTO: 74.2 FL (ref 79–97)
MONOCYTES # BLD AUTO: 0.57 10*3/MM3 (ref 0.1–0.9)
MONOCYTES NFR BLD AUTO: 8.5 % (ref 5–12)
NEUTROPHILS NFR BLD AUTO: 4.72 10*3/MM3 (ref 1.7–7)
NEUTROPHILS NFR BLD AUTO: 70.4 % (ref 42.7–76)
NITRITE UR QL STRIP: POSITIVE
NRBC BLD AUTO-RTO: 0 /100 WBC (ref 0–0.2)
PH UR STRIP.AUTO: <=5 [PH] (ref 5–9)
PLATELET # BLD AUTO: 327 10*3/MM3 (ref 140–450)
PMV BLD AUTO: 10 FL (ref 6–12)
POTASSIUM SERPL-SCNC: 3.8 MMOL/L (ref 3.5–5.2)
PROT SERPL-MCNC: 7.2 G/DL (ref 6–8.5)
PROT UR QL STRIP: ABNORMAL
RBC # BLD AUTO: 5.08 10*6/MM3 (ref 3.77–5.28)
RBC # UR STRIP: ABNORMAL /HPF
REF LAB TEST METHOD: ABNORMAL
SARS-COV-2 RNA RESP QL NAA+PROBE: NOT DETECTED
SODIUM SERPL-SCNC: 140 MMOL/L (ref 136–145)
SP GR UR STRIP: 1.04 (ref 1–1.03)
SQUAMOUS #/AREA URNS HPF: ABNORMAL /HPF
TROPONIN T SERPL-MCNC: <0.01 NG/ML (ref 0–0.03)
UROBILINOGEN UR QL STRIP: ABNORMAL
WBC # UR STRIP: ABNORMAL /HPF
WBC NRBC COR # BLD: 6.71 10*3/MM3 (ref 3.4–10.8)

## 2022-06-03 PROCEDURE — 81001 URINALYSIS AUTO W/SCOPE: CPT | Performed by: STUDENT IN AN ORGANIZED HEALTH CARE EDUCATION/TRAINING PROGRAM

## 2022-06-03 PROCEDURE — 84484 ASSAY OF TROPONIN QUANT: CPT | Performed by: STUDENT IN AN ORGANIZED HEALTH CARE EDUCATION/TRAINING PROGRAM

## 2022-06-03 PROCEDURE — 99283 EMERGENCY DEPT VISIT LOW MDM: CPT

## 2022-06-03 PROCEDURE — 93010 ELECTROCARDIOGRAM REPORT: CPT | Performed by: INTERNAL MEDICINE

## 2022-06-03 PROCEDURE — 87636 SARSCOV2 & INF A&B AMP PRB: CPT | Performed by: STUDENT IN AN ORGANIZED HEALTH CARE EDUCATION/TRAINING PROGRAM

## 2022-06-03 PROCEDURE — 85025 COMPLETE CBC W/AUTO DIFF WBC: CPT | Performed by: STUDENT IN AN ORGANIZED HEALTH CARE EDUCATION/TRAINING PROGRAM

## 2022-06-03 PROCEDURE — 93005 ELECTROCARDIOGRAM TRACING: CPT | Performed by: STUDENT IN AN ORGANIZED HEALTH CARE EDUCATION/TRAINING PROGRAM

## 2022-06-03 PROCEDURE — C9803 HOPD COVID-19 SPEC COLLECT: HCPCS

## 2022-06-03 PROCEDURE — 80053 COMPREHEN METABOLIC PANEL: CPT | Performed by: STUDENT IN AN ORGANIZED HEALTH CARE EDUCATION/TRAINING PROGRAM

## 2022-06-03 PROCEDURE — 83036 HEMOGLOBIN GLYCOSYLATED A1C: CPT | Performed by: STUDENT IN AN ORGANIZED HEALTH CARE EDUCATION/TRAINING PROGRAM

## 2022-06-03 RX ORDER — SODIUM CHLORIDE 0.9 % (FLUSH) 0.9 %
10 SYRINGE (ML) INJECTION AS NEEDED
Status: DISCONTINUED | OUTPATIENT
Start: 2022-06-03 | End: 2022-06-03 | Stop reason: HOSPADM

## 2022-06-03 RX ADMIN — SODIUM CHLORIDE 1000 ML: 9 INJECTION, SOLUTION INTRAVENOUS at 11:09

## 2022-06-03 NOTE — ED TRIAGE NOTES
Pt presents to ED with c/o headache, blurred vision, lightheadedness, and hyperglycemia over the past two weeks that is becoming worse. Pt reports her blood sugar was over 600 this morning around 0800.

## 2022-06-03 NOTE — ED PROVIDER NOTES
Subjective   46 y/o female with CMH of diabetes complicated with neuropathy who presents to the ED due to hyperglycemia. Patient reports that the last couple of weeks she has been having glucose reading higher than 500 (her reader say high when is over 500). Today, she called her PCP and was advised to come to the ED. Patient was diagnosed with DM about 4 years ago. She is currently taking Lantus (10-15 units) at night on an sliding scale and Lispro 6 units with meals. For the last couple of days she has been injecting up to 24 units of Lispro when her glucose is high. She reports that when she measures that her glucose is high she feels some dizziness. She reports that she does not check her glucose everyday, but when she feels dizzy.           Review of Systems   Constitutional: Positive for fatigue. Negative for appetite change and diaphoresis.   Respiratory: Negative for chest tightness and shortness of breath.    Cardiovascular: Negative for chest pain and palpitations.   Gastrointestinal: Negative for abdominal pain, constipation, diarrhea, nausea and vomiting.   Genitourinary: Positive for frequency. Negative for dysuria, hematuria and urgency.   Musculoskeletal: Negative for back pain and myalgias.   Neurological: Positive for dizziness. Negative for light-headedness and headaches.       Past Medical History:   Diagnosis Date   • Encounter for insertion of intrauterine contraceptive device      replace mirena      • History of female sterilization    • IUD check up    • Neuropathy    • Pain in pelvis    • PTSD (post-traumatic stress disorder)    • Restless leg syndrome    • Surgical follow-up care        No Known Allergies    Past Surgical History:   Procedure Laterality Date   • HYSTEROSCOPY TUBAL STERILIZATION  10/22/2014    Essure tubal sterilization precedure.   • INJECTION OF MEDICATION  09/03/2014    Dep Provera   • PAP SMEAR  03/10/2008    neg   • VAGINAL DELIVERY  04/05/2008       Family History    Problem Relation Age of Onset   • Diabetes Mother    • Heart disease Mother    • Rheum arthritis Father    • Breast cancer Neg Hx    • Colon cancer Neg Hx    • Endometrial cancer Neg Hx    • Ovarian cancer Neg Hx        Social History     Socioeconomic History   • Marital status:    Tobacco Use   • Smoking status: Never Smoker   • Smokeless tobacco: Never Used   Substance and Sexual Activity   • Alcohol use: No   • Drug use: No   • Sexual activity: Defer           Objective   Physical Exam  Vitals reviewed.   Constitutional:       General: She is not in acute distress.     Appearance: She is not ill-appearing or toxic-appearing.   HENT:      Head: Normocephalic and atraumatic.      Right Ear: External ear normal.      Left Ear: External ear normal.      Mouth/Throat:      Mouth: Mucous membranes are moist.   Eyes:      Conjunctiva/sclera: Conjunctivae normal.   Cardiovascular:      Rate and Rhythm: Normal rate and regular rhythm.   Pulmonary:      Effort: Pulmonary effort is normal.      Breath sounds: Normal breath sounds. No wheezing.   Abdominal:      Palpations: Abdomen is soft.      Tenderness: There is no abdominal tenderness.   Musculoskeletal:      Right lower leg: No edema.      Left lower leg: No edema.   Skin:     General: Skin is warm and dry.   Neurological:      Mental Status: She is alert and oriented to person, place, and time.         ECG 12 Lead      Date/Time: 6/3/2022 1:19 PM  Performed by: Alexus Gallego MD  Authorized by: Alexus Gallego MD   Interpreted by physician  Previous ECG: no previous ECG available  Rhythm: sinus rhythm  BPM: 90  Other findings: LAE                 ED Course      Results for orders placed or performed during the hospital encounter of 06/03/22   COVID-19 and FLU A/B PCR - Swab, Nasopharynx    Specimen: Nasopharynx; Swab   Result Value Ref Range    COVID19 Not Detected Not Detected - Ref. Range    Influenza A PCR Not Detected Not Detected    Influenza B PCR  Not Detected Not Detected   Comprehensive Metabolic Panel    Specimen: Blood   Result Value Ref Range    Glucose 242 (H) 65 - 99 mg/dL    BUN 9 6 - 20 mg/dL    Creatinine 0.55 (L) 0.57 - 1.00 mg/dL    Sodium 140 136 - 145 mmol/L    Potassium 3.8 3.5 - 5.2 mmol/L    Chloride 104 98 - 107 mmol/L    CO2 21.0 (L) 22.0 - 29.0 mmol/L    Calcium 10.1 8.6 - 10.5 mg/dL    Total Protein 7.2 6.0 - 8.5 g/dL    Albumin 4.50 3.50 - 5.20 g/dL    ALT (SGPT) 26 1 - 33 U/L    AST (SGOT) 28 1 - 32 U/L    Alkaline Phosphatase 98 39 - 117 U/L    Total Bilirubin <0.2 0.0 - 1.2 mg/dL    Globulin 2.7 gm/dL    A/G Ratio 1.7 g/dL    BUN/Creatinine Ratio 16.4 7.0 - 25.0    Anion Gap 15.0 5.0 - 15.0 mmol/L    eGFR 114.6 >60.0 mL/min/1.73   Urinalysis With Microscopic If Indicated (No Culture) - Urine, Clean Catch    Specimen: Urine, Clean Catch   Result Value Ref Range    Color, UA Yellow Yellow, Straw, Dark Yellow, Pretty    Appearance, UA Cloudy (A) Clear    pH, UA <=5.0 5.0 - 9.0    Specific Gravity, UA 1.039 (H) 1.003 - 1.030    Glucose, UA >=1000 mg/dL (3+) (A) Negative    Ketones, UA Negative Negative    Bilirubin, UA Negative Negative    Blood, UA Trace (A) Negative    Protein, UA Trace (A) Negative    Leuk Esterase, UA Negative Negative    Nitrite, UA Positive (A) Negative    Urobilinogen, UA 0.2 E.U./dL 0.2 - 1.0 E.U./dL   Hemoglobin A1c    Specimen: Blood   Result Value Ref Range    Hemoglobin A1C 15.40 (H) 4.80 - 5.60 %   CBC Auto Differential    Specimen: Blood   Result Value Ref Range    WBC 6.71 3.40 - 10.80 10*3/mm3    RBC 5.08 3.77 - 5.28 10*6/mm3    Hemoglobin 12.1 12.0 - 15.9 g/dL    Hematocrit 37.7 34.0 - 46.6 %    MCV 74.2 (L) 79.0 - 97.0 fL    MCH 23.8 (L) 26.6 - 33.0 pg    MCHC 32.1 31.5 - 35.7 g/dL    RDW 15.0 12.3 - 15.4 %    RDW-SD 39.9 37.0 - 54.0 fl    MPV 10.0 6.0 - 12.0 fL    Platelets 327 140 - 450 10*3/mm3    Neutrophil % 70.4 42.7 - 76.0 %    Lymphocyte % 18.6 (L) 19.6 - 45.3 %    Monocyte % 8.5 5.0 - 12.0 %     Eosinophil % 0.9 0.3 - 6.2 %    Basophil % 1.0 0.0 - 1.5 %    Immature Grans % 0.6 (H) 0.0 - 0.5 %    Neutrophils, Absolute 4.72 1.70 - 7.00 10*3/mm3    Lymphocytes, Absolute 1.25 0.70 - 3.10 10*3/mm3    Monocytes, Absolute 0.57 0.10 - 0.90 10*3/mm3    Eosinophils, Absolute 0.06 0.00 - 0.40 10*3/mm3    Basophils, Absolute 0.07 0.00 - 0.20 10*3/mm3    Immature Grans, Absolute 0.04 0.00 - 0.05 10*3/mm3    nRBC 0.0 0.0 - 0.2 /100 WBC   Troponin    Specimen: Blood   Result Value Ref Range    Troponin T <0.010 0.000 - 0.030 ng/mL   Urinalysis, Microscopic Only - Urine, Clean Catch    Specimen: Urine, Clean Catch   Result Value Ref Range    RBC, UA 0-2 (A) None Seen /HPF    WBC, UA Too Numerous to Count (A) None Seen, 0-2, 3-5 /HPF    Bacteria, UA 4+ (A) None Seen /HPF    Squamous Epithelial Cells, UA 3-5 (A) None Seen, 0-2 /HPF    Hyaline Casts, UA 0-2 None Seen /LPF    Methodology Manual Light Microscopy    Gold Top - SST   Result Value Ref Range    Extra Tube Hold for add-ons.         No radiology results for the last 7 days                                    HEART Score (for prediction of 6-week risk of major adverse cardiac event) reviewed and/or performed as part of the patient evaluation and treatment planning process.  The result associated with this review/performance is: 2       MDM  Number of Diagnoses or Management Options      Final diagnoses:   Type 2 diabetes mellitus with hyperglycemia, without long-term current use of insulin (HCC)       ED Disposition  ED Disposition     ED Disposition   Discharge    Condition   Stable    Comment   --             Kieran Valle MD  [address] [place] [id number] [phone]    In 2 days           Medication List      No changes were made to your prescriptions during this visit.          Alexus Gallego MD  Resident  06/03/22 [id number]

## 2022-06-08 ENCOUNTER — OFFICE VISIT (OUTPATIENT)
Dept: FAMILY MEDICINE CLINIC | Facility: CLINIC | Age: 47
End: 2022-06-08

## 2022-06-08 ENCOUNTER — TELEPHONE (OUTPATIENT)
Dept: FAMILY MEDICINE CLINIC | Facility: CLINIC | Age: 47
End: 2022-06-08

## 2022-06-08 VITALS
BODY MASS INDEX: 24.73 KG/M2 | HEART RATE: 105 BPM | DIASTOLIC BLOOD PRESSURE: 76 MMHG | TEMPERATURE: 97.8 F | OXYGEN SATURATION: 99 % | WEIGHT: 131 LBS | HEIGHT: 61 IN | SYSTOLIC BLOOD PRESSURE: 117 MMHG

## 2022-06-08 DIAGNOSIS — N39.0 URINARY TRACT INFECTION WITH HEMATURIA, SITE UNSPECIFIED: Primary | ICD-10-CM

## 2022-06-08 DIAGNOSIS — R31.9 URINARY TRACT INFECTION WITH HEMATURIA, SITE UNSPECIFIED: Primary | ICD-10-CM

## 2022-06-08 DIAGNOSIS — N32.89 BLADDER SPASM: ICD-10-CM

## 2022-06-08 DIAGNOSIS — R81 GLUCOSURIA: ICD-10-CM

## 2022-06-08 DIAGNOSIS — R30.9 PAINFUL URINATION: ICD-10-CM

## 2022-06-08 LAB
BILIRUB BLD-MCNC: NEGATIVE MG/DL
CLARITY, POC: ABNORMAL
COLOR UR: YELLOW
GLUCOSE UR STRIP-MCNC: ABNORMAL MG/DL
KETONES UR QL: NEGATIVE
LEUKOCYTE EST, POC: ABNORMAL
NITRITE UR-MCNC: NEGATIVE MG/ML
PH UR: 6 [PH] (ref 5–8)
PROT UR STRIP-MCNC: NEGATIVE MG/DL
RBC # UR STRIP: ABNORMAL /UL
SP GR UR: 1.01 (ref 1–1.03)
UROBILINOGEN UR QL: NORMAL

## 2022-06-08 PROCEDURE — 99213 OFFICE O/P EST LOW 20 MIN: CPT | Performed by: FAMILY MEDICINE

## 2022-06-08 RX ORDER — OXYBUTYNIN CHLORIDE 5 MG/1
2.5-5 TABLET ORAL 3 TIMES DAILY
Qty: 30 TABLET | Refills: 0 | Status: SHIPPED | OUTPATIENT
Start: 2022-06-08 | End: 2022-11-07

## 2022-06-08 RX ORDER — CEFUROXIME AXETIL 500 MG/1
500 TABLET ORAL 2 TIMES DAILY
Qty: 20 TABLET | Refills: 0 | Status: SHIPPED | OUTPATIENT
Start: 2022-06-08 | End: 2022-11-07

## 2022-06-08 RX ORDER — BLOOD-GLUCOSE METER
KIT MISCELLANEOUS
COMMUNITY
Start: 2022-03-23

## 2022-06-08 NOTE — PROGRESS NOTES
" Subjective   Carol Perez is a 47 y.o. female.     Chief Complaint   Patient presents with   • Diabetes         Cc:  Bladder.     History of Present Illness     Er on 6/3, ua glucose, blood, nitrite  She continues to have bladder spasms, she feels like her bladder is going to come out when she urinates.   She is to see endocrinologist soon.   Today she feels discomfort in her kidneys.     Review of Systems   Constitutional: Negative for chills, fatigue and fever.   HENT: Negative for congestion, ear discharge, ear pain, facial swelling, hearing loss, postnasal drip, rhinorrhea, sinus pressure, sore throat, trouble swallowing and voice change.    Eyes: Negative for discharge, redness and visual disturbance.   Respiratory: Negative for cough, chest tightness, shortness of breath and wheezing.    Cardiovascular: Negative for chest pain and palpitations.   Gastrointestinal: Negative for abdominal pain, blood in stool, constipation, diarrhea, nausea and vomiting.   Endocrine: Negative for polydipsia and polyuria.   Genitourinary: Positive for urgency. Negative for dysuria, flank pain and hematuria.   Musculoskeletal: Negative for arthralgias, back pain, joint swelling and myalgias.   Skin: Negative for rash.   Neurological: Negative for dizziness, weakness, numbness and headaches.   Hematological: Negative for adenopathy.   Psychiatric/Behavioral: Negative for confusion and sleep disturbance. The patient is not nervous/anxious.            /76 (BP Location: Left arm, Patient Position: Sitting, Cuff Size: Adult)   Pulse 105   Temp 97.8 °F (36.6 °C) (Infrared)   Ht 154.9 cm (60.98\")   Wt 59.4 kg (131 lb)   LMP 05/27/2022 (Approximate)   SpO2 99%   BMI 24.77 kg/m²       Objective     Physical Exam  Vitals and nursing note reviewed.   Constitutional:       Appearance: Normal appearance. She is well-developed.   HENT:      Head: Normocephalic and atraumatic.      Right Ear: External ear normal.      " Left Ear: External ear normal.      Nose: Nose normal.   Eyes:      Extraocular Movements: Extraocular movements intact.      Conjunctiva/sclera: Conjunctivae normal.      Pupils: Pupils are equal, round, and reactive to light.   Pulmonary:      Effort: Pulmonary effort is normal.   Musculoskeletal:         General: Normal range of motion.      Cervical back: Normal range of motion.   Neurological:      General: No focal deficit present.      Mental Status: She is alert and oriented to person, place, and time.   Psychiatric:         Behavior: Behavior normal.         Thought Content: Thought content normal.         Judgment: Judgment normal.             PAST MEDICAL HISTORY     Past Medical History:   Diagnosis Date   • Encounter for insertion of intrauterine contraceptive device      replace mirena      • History of female sterilization    • IUD check up    • Neuropathy    • Pain in pelvis    • PTSD (post-traumatic stress disorder)    • Restless leg syndrome    • Surgical follow-up care       PAST SURGICAL HISTORY     Past Surgical History:   Procedure Laterality Date   • HYSTEROSCOPY TUBAL STERILIZATION  10/22/2014    Essure tubal sterilization precedure.   • INJECTION OF MEDICATION  09/03/2014    Dep Provera   • PAP SMEAR  03/10/2008    neg   • VAGINAL DELIVERY  04/05/2008      SOCIAL HISTORY     Social History     Socioeconomic History   • Marital status:    Tobacco Use   • Smoking status: Never Smoker   • Smokeless tobacco: Never Used   Substance and Sexual Activity   • Alcohol use: No   • Drug use: No   • Sexual activity: Defer      ALLERGIES   Patient has no known allergies.   MEDICATIONS     Current Outpatient Medications   Medication Sig Dispense Refill   • amitriptyline (ELAVIL) 10 MG tablet Take 10 mg by mouth Every Night.     • Insulin Glargine (LANTUS SOLOSTAR) 100 UNIT/ML injection pen Inject 15 Units under the skin into the appropriate area as directed Daily. Adjust per sliding scale. 9 pen 0  "  • Insulin Lispro, 1 Unit Dial, (HumaLOG KwikPen) 100 UNIT/ML solution pen-injector Inject 6 Units under the skin into the appropriate area as directed 3 (Three) Times a Day Before Meals. 9 mL 0   • pravastatin (Pravachol) 40 MG tablet Take 1 tablet by mouth Every Night. 90 tablet 1   • sertraline (Zoloft) 50 MG tablet Take 0.5-1 tablets by mouth Daily. 90 tablet 3   • vitamin D (ERGOCALCIFEROL) 1.25 MG (60476 UT) capsule capsule Take 1 capsule by mouth 1 (One) Time Per Week. 12 capsule 3   • Blood Glucose Monitoring Suppl (FreeStyle Lite) w/Device kit 4 TIMES DAILY     • cefuroxime (CEFTIN) 500 MG tablet Take 1 tablet by mouth 2 (Two) Times a Day. 20 tablet 0   • glucose blood (FREESTYLE TEST STRIPS) test strip 1 each by Other route 4 (Four) Times a Day. 200 each 12   • glucose blood test strip Test qid. SHE NEEDS BLOOD GLUCOSE METER AS WELL. E11.65 AND Z79.4 150 each 12   • Insulin Pen Needle (AboutTime Pen Needle) 32G X 4 MM misc Inject 4 times daily 120 each 11   • Insulin Syringe 30G X 5/16\" 1 ML misc 1 each 2 (Two) Times a Day. 100 each 11   • Lancets (FREESTYLE) lancets 1 each by Other route 2 (Two) Times a Day As Needed (sugar). 100 each 12   • oxybutynin (DITROPAN) 5 MG tablet Take 0.5-1 tablets by mouth 3 (Three) Times a Day. 30 tablet 0     No current facility-administered medications for this visit.        The following portions of the patient's history were reviewed and updated as appropriate: allergies, current medications, past family history, past medical history, past social history, past surgical history and problem list.        Assessment & Plan   Diagnoses and all orders for this visit:    1. Urinary tract infection with hematuria, site unspecified (Primary)    2. Painful urination  -     POCT urinalysis dipstick, manual    3. Glucosuria    4. Bladder spasm    Other orders  -     cefuroxime (CEFTIN) 500 MG tablet; Take 1 tablet by mouth 2 (Two) Times a Day.  Dispense: 20 tablet; Refill: 0  -     " oxybutynin (DITROPAN) 5 MG tablet; Take 0.5-1 tablets by mouth 3 (Three) Times a Day.  Dispense: 30 tablet; Refill: 0    urine today, blood, carmella esterase, glucose.     Treat for uti.  Send urine for culture.     Ditropan for bladder spasm.  SHE UNDERSTANDS GLUCOSE IN URINE CAN CAUSE THIS.  Warned of drowsiness.      Latest Reference Range & Units 06/08/22 15:42   Color, UA Yellow, Straw, Dark Yellow, Pretty  Yellow   Appearance, UA Clear  Cloudy !   Specific Gravity, UA 1.005 - 1.030  1.015   PH, UA 5.0 - 8.0  6.0   Glucose Negative, 1000 mg/dL (3+) mg/dL 3+ !   Ketones, UA Negative  Negative   Blood, UA Negative  1+ !   Nitrite, UA Negative  Negative   Leukocytes, UA Negative  Trace !   Protein, UA Negative mg/dL Negative   Bilirubin, UA Negative  Negative   Urobilinogen, UA Normal  Normal   !: Data is abnormal                   No follow-ups on file.                  This document has been electronically signed by Kieran Valle MD on June 8, 2022 15:28 CDT

## 2022-06-09 LAB
QT INTERVAL: 342 MS
QTC INTERVAL: 418 MS

## 2022-06-21 ENCOUNTER — TELEMEDICINE (OUTPATIENT)
Dept: ENDOCRINOLOGY | Facility: CLINIC | Age: 47
End: 2022-06-21

## 2022-06-21 DIAGNOSIS — E11.42 DIABETIC POLYNEUROPATHY ASSOCIATED WITH TYPE 2 DIABETES MELLITUS: ICD-10-CM

## 2022-06-21 DIAGNOSIS — Z79.4 TYPE 2 DIABETES MELLITUS WITH HYPERGLYCEMIA, WITH LONG-TERM CURRENT USE OF INSULIN: Primary | ICD-10-CM

## 2022-06-21 DIAGNOSIS — E78.2 MIXED HYPERLIPIDEMIA: ICD-10-CM

## 2022-06-21 DIAGNOSIS — E11.65 TYPE 2 DIABETES MELLITUS WITH HYPERGLYCEMIA, WITH LONG-TERM CURRENT USE OF INSULIN: Primary | ICD-10-CM

## 2022-06-21 DIAGNOSIS — E55.9 VITAMIN D DEFICIENCY: ICD-10-CM

## 2022-06-21 PROCEDURE — 99214 OFFICE O/P EST MOD 30 MIN: CPT | Performed by: NURSE PRACTITIONER

## 2022-06-21 NOTE — PROGRESS NOTES
Chief Complaint  Diabetes    Subjective          Carol Perez presents to [facility] ENDOCRINOLOGY for   History of Present Illness       You have chosen to receive care through a telehealth visit.  Do you consent to use a video/audio connection for your medical care today? Yes            TELEHEALTH VIDEO VISIT     This a video visit due to [facility] current guidelines for social distancing due to the COVID 19 pandemic      47 year old female presents for follow up      Reason diabetes mellitus type 2    Duration diagnosed in 2017    Timing constant    Quality not controlled     Severity - high due to hyperglycemia         Microvascular-- neuropathy         Context--presented with symptoms and lab work revealed she did have diabetes     She did have gestational diabetes            Diabetes Regimen--        insulin              Blood Glucose Readings     Checking 2 times daily                Review of Systems - General ROS: positive for  - fatigue                  Objective   Vital Signs:   There were no vitals taken for this visit.    Physical Exam  Constitutional:       Appearance: Normal appearance.   HENT:      Head: Normocephalic.      Right Ear: External ear normal.      Left Ear: External ear normal.   Eyes:      Conjunctiva/sclera: Conjunctivae normal.      Pupils: Pupils are equal, round, and reactive to light.   Cardiovascular:      Rate and Rhythm: Regular rhythm.      Heart sounds: Normal heart sounds.   Pulmonary:      Breath sounds: Normal breath sounds.   Musculoskeletal:         General: Normal range of motion.      Cervical back: Normal range of motion and neck supple.   Skin:     Coloration: Skin is not pale.   Neurological:      General: No focal deficit present.      Mental Status: She is alert.   Psychiatric:         Mood and Affect: Mood normal.         Thought Content: Thought content normal.         Judgment: Judgment normal.        Result Review :   The  following data was reviewed by: TRACI Salazar on 01/28/2021:  CMP    CMP 3/28/22 6/3/22   Glucose 623 (A) 242 (A)   BUN 8 9   Creatinine 0.88 0.55 (A)   Sodium 135 (A) 140   Potassium 4.3 3.8   Chloride 99 104   Calcium 9.2 10.1   Albumin 4.40 4.50   Total Bilirubin 0.2 <0.2   Alkaline Phosphatase 98 98   AST (SGOT) 14 28   ALT (SGPT) 19 26   (A) Abnormal value            CBC    CBC 3/28/22 6/3/22   WBC 5.67 6.71   RBC 4.82 5.08   Hemoglobin 12.1 12.1   Hematocrit 40.0 37.7   MCV 83.0 74.2 (A)   MCH 25.1 (A) 23.8 (A)   MCHC 30.3 (A) 32.1   RDW 14.1 15.0   Platelets 341 327   (A) Abnormal value            Lipid Panel    Lipid Panel 3/28/22   Total Cholesterol 320 (A)   Triglycerides 341 (A)   HDL Cholesterol 44   VLDL Cholesterol 70 (A)   LDL Cholesterol  206 (A)   LDL/HDL Ratio 4.72   (A) Abnormal value            TSH    TSH 3/28/22   TSH 1.290           A1C Last 3 Results    HGBA1C Last 3 Results 3/28/22 6/3/22   Hemoglobin A1C 14.60 (A) 15.40 (A)   (A) Abnormal value                          Assessment and Plan    Problem List Items Addressed This Visit        Other    Type 2 diabetes mellitus with hyperglycemia, with long-term current use of insulin (HCC) - Primary    Diabetic polyneuropathy associated with type 2 diabetes mellitus (HCC)    Vitamin D deficiency    Mixed hyperlipidemia      Glycemic Management     Diabetes mellitus type 2         Lab Results   Component Value Date    HGBA1C 15.40 (H) 06/03/2022         She just restarted her insulin       Taking lantus 26 units --- increase to 30 units      If you wake up with a blood glucose level less than 80 decrease by 4 units            Taking Humalog      Taking 10 units before each meal --increase up to 14 units TID     +       Sliding scale       Blood sugar  Insulin       200-250 2 units   251-300 4 units   301-350 6 units   Above 351 8 units    ---        Goals for sugar     Fasting and before meals 80 to 130     2 hours after meals 180 or  less        Aim for 45 grams of carbohydrate per meal     Aim for 15 grams of carbohydrate per snack           Previous regimen      Metformin stopped due to side effects         Novolin 70/30 units -- taking 55 units at night -she is not taking the daytime dosage--- stop today and change to the following                    Lipid Management              Hyperlipidemia       Taking  Crestor 10 mg one night     Lab Results   Component Value Date     (H) 03/28/2022             Blood Pressure Management     Not on ACEi              Microvascular Complication Monitoring        Last eye exam July 2020 , no DR            Component      Latest Ref Rng & Units 10/27/2020 10/27/2020          11:57 AM 11:57 AM   Protein/Creatinine Ratio      0.0 - 200.0 mg/G Crea 177.9     Creatinine, Urine      mg/dL 50.6 50.6   Total Protein, Urine      mg/dL 9.0     Microalbumin/Creatinine Ratio      mg/g   39.5   Microalbumin, Urine      mg/dL   2.0         Neuropathy -  Bilateral feet       Taking Elavil 10 mg 1 at night     Bone Health            vitamin d def.             TAKING  50,000 units once weekly      Other Diabetes Related Aspects    Lab Results   Component Value Date    [id number] 528 01/26/2021          Thyroid health        Lab Results   Component Value Date    TSH 1.290 03/28/2022                Follow Up   No follow-ups on file.  Patient was given instructions and counseling regarding her condition or for health maintenance advice. Please see specific information pulled into the AVS if appropriate.

## 2022-06-28 ENCOUNTER — OFFICE VISIT (OUTPATIENT)
Dept: ENDOCRINOLOGY | Facility: CLINIC | Age: 47
End: 2022-06-28

## 2022-06-28 DIAGNOSIS — E11.65 TYPE 2 DIABETES MELLITUS WITH HYPERGLYCEMIA, WITH LONG-TERM CURRENT USE OF INSULIN: ICD-10-CM

## 2022-06-28 DIAGNOSIS — Z79.4 TYPE 2 DIABETES MELLITUS WITH HYPERGLYCEMIA, WITH LONG-TERM CURRENT USE OF INSULIN: ICD-10-CM

## 2022-06-28 PROCEDURE — G0108 DIAB MANAGE TRN  PER INDIV: HCPCS | Performed by: DIETITIAN, REGISTERED

## 2022-06-28 NOTE — PROGRESS NOTES
Carol Perez is a 47 y.o. female referred for outpatient diabetes education by Darshan AVILES. Patient attended individual education for 30 minutes on 06/28/2022. Topics covered included:     1. Healthy Food Choices   i. Provided patient with detailed carbohydrate counting guide.    ii. Instructed patient to eat 30-45 grams of carbohydrate with each meal (2-3 exchange choices) and 15 grams of carb for snacks (1 exchange choices).   iii. Provided patient with list of non-starchy vegetables and foods that are low in carbohydrate for snacks and to incorporate with meals (Planning Healthy Meals Packet).    iv. Choose fruits, vegetables, whole grains, legumes, low-fat milk, fiber-rich foods, minimal saturated fats, and watch cholesterol and sodium intake.    v. Reviewed carbohydrate-containing foods, standard serving sizes, and measuring foods.   vi. Reviewed the difference between simple and complex carbohydrate. Encouraged patient to choose complex carbohydrates more often.      2. Provided patient with new meal time insulin instructions. Instructed pt for take 14 units Humalog for a small meal, 18 units for a medium meal, 22 units for a large meal. Provided pt with written instructions.      Provided patient with Diabetes and You book, and Planning Healthy Meals book. Provided handout of sample menu with carbs listed.      Thank you Darshan AVILES for this referral.        DIANA Moreland, GOLDY, LD

## 2022-08-03 ENCOUNTER — APPOINTMENT (OUTPATIENT)
Dept: GENERAL RADIOLOGY | Facility: HOSPITAL | Age: 47
End: 2022-08-03

## 2022-08-03 ENCOUNTER — HOSPITAL ENCOUNTER (EMERGENCY)
Facility: HOSPITAL | Age: 47
Discharge: HOME | End: 2022-08-04
Attending: STUDENT IN AN ORGANIZED HEALTH CARE EDUCATION/TRAINING PROGRAM | Admitting: STUDENT IN AN ORGANIZED HEALTH CARE EDUCATION/TRAINING PROGRAM

## 2022-08-03 DIAGNOSIS — B34.2 CORONAVIRUS INFECTION: Primary | ICD-10-CM

## 2022-08-03 LAB
ALBUMIN SERPL-MCNC: 4.2 G/DL (ref 3.5–5.2)
ALBUMIN/GLOB SERPL: 1.6 G/DL
ALP SERPL-CCNC: 87 U/L (ref 39–117)
ALT SERPL W P-5'-P-CCNC: 36 U/L (ref 1–33)
ANION GAP SERPL CALCULATED.3IONS-SCNC: 15 MMOL/L (ref 5–15)
AST SERPL-CCNC: 29 U/L (ref 1–32)
BASOPHILS # BLD AUTO: 0.06 10*3/MM3 (ref 0–0.2)
BASOPHILS NFR BLD AUTO: 0.9 % (ref 0–1.5)
BILIRUB SERPL-MCNC: 0.2 MG/DL (ref 0–1.2)
BUN SERPL-MCNC: 9 MG/DL (ref 6–20)
BUN/CREAT SERPL: 15.3 (ref 7–25)
CALCIUM SPEC-SCNC: 8.6 MG/DL (ref 8.6–10.5)
CHLORIDE SERPL-SCNC: 101 MMOL/L (ref 98–107)
CO2 SERPL-SCNC: 21 MMOL/L (ref 22–29)
CREAT SERPL-MCNC: 0.59 MG/DL (ref 0.57–1)
D-DIMER, QUANTITATIVE (MAD,POW, STR): 366 NG/ML (FEU) (ref 0–470)
DEPRECATED RDW RBC AUTO: 44.1 FL (ref 37–54)
EGFRCR SERPLBLD CKD-EPI 2021: 112.7 ML/MIN/1.73
EOSINOPHIL # BLD AUTO: 0.09 10*3/MM3 (ref 0–0.4)
EOSINOPHIL NFR BLD AUTO: 1.3 % (ref 0.3–6.2)
ERYTHROCYTE [DISTWIDTH] IN BLOOD BY AUTOMATED COUNT: 16.7 % (ref 12.3–15.4)
FLUAV SUBTYP SPEC NAA+PROBE: NOT DETECTED
FLUBV RNA ISLT QL NAA+PROBE: NOT DETECTED
GLOBULIN UR ELPH-MCNC: 2.6 GM/DL
GLUCOSE SERPL-MCNC: 317 MG/DL (ref 65–99)
HCT VFR BLD AUTO: 35.5 % (ref 34–46.6)
HGB BLD-MCNC: 11.8 G/DL (ref 12–15.9)
IMM GRANULOCYTES # BLD AUTO: 0.03 10*3/MM3 (ref 0–0.05)
IMM GRANULOCYTES NFR BLD AUTO: 0.4 % (ref 0–0.5)
LYMPHOCYTES # BLD AUTO: 0.98 10*3/MM3 (ref 0.7–3.1)
LYMPHOCYTES NFR BLD AUTO: 14.3 % (ref 19.6–45.3)
MCH RBC QN AUTO: 24.6 PG (ref 26.6–33)
MCHC RBC AUTO-ENTMCNC: 33.2 G/DL (ref 31.5–35.7)
MCV RBC AUTO: 74 FL (ref 79–97)
MONOCYTES # BLD AUTO: 0.9 10*3/MM3 (ref 0.1–0.9)
MONOCYTES NFR BLD AUTO: 13.2 % (ref 5–12)
NEUTROPHILS NFR BLD AUTO: 4.78 10*3/MM3 (ref 1.7–7)
NEUTROPHILS NFR BLD AUTO: 69.9 % (ref 42.7–76)
NRBC BLD AUTO-RTO: 0 /100 WBC (ref 0–0.2)
NT-PROBNP SERPL-MCNC: 27.2 PG/ML (ref 0–450)
PLATELET # BLD AUTO: 294 10*3/MM3 (ref 140–450)
PMV BLD AUTO: 10.1 FL (ref 6–12)
POTASSIUM SERPL-SCNC: 3.6 MMOL/L (ref 3.5–5.2)
PROT SERPL-MCNC: 6.8 G/DL (ref 6–8.5)
RBC # BLD AUTO: 4.8 10*6/MM3 (ref 3.77–5.28)
SARS-COV-2 RNA PNL SPEC NAA+PROBE: DETECTED
SODIUM SERPL-SCNC: 137 MMOL/L (ref 136–145)
TROPONIN T SERPL-MCNC: <0.01 NG/ML (ref 0–0.03)
WBC NRBC COR # BLD: 6.84 10*3/MM3 (ref 3.4–10.8)

## 2022-08-03 PROCEDURE — 99284 EMERGENCY DEPT VISIT MOD MDM: CPT

## 2022-08-03 PROCEDURE — 87636 SARSCOV2 & INF A&B AMP PRB: CPT | Performed by: STUDENT IN AN ORGANIZED HEALTH CARE EDUCATION/TRAINING PROGRAM

## 2022-08-03 PROCEDURE — 85379 FIBRIN DEGRADATION QUANT: CPT | Performed by: STUDENT IN AN ORGANIZED HEALTH CARE EDUCATION/TRAINING PROGRAM

## 2022-08-03 PROCEDURE — 80053 COMPREHEN METABOLIC PANEL: CPT | Performed by: STUDENT IN AN ORGANIZED HEALTH CARE EDUCATION/TRAINING PROGRAM

## 2022-08-03 PROCEDURE — 85025 COMPLETE CBC W/AUTO DIFF WBC: CPT | Performed by: STUDENT IN AN ORGANIZED HEALTH CARE EDUCATION/TRAINING PROGRAM

## 2022-08-03 PROCEDURE — 71045 X-RAY EXAM CHEST 1 VIEW: CPT

## 2022-08-03 PROCEDURE — 83880 ASSAY OF NATRIURETIC PEPTIDE: CPT | Performed by: STUDENT IN AN ORGANIZED HEALTH CARE EDUCATION/TRAINING PROGRAM

## 2022-08-03 PROCEDURE — 84484 ASSAY OF TROPONIN QUANT: CPT | Performed by: STUDENT IN AN ORGANIZED HEALTH CARE EDUCATION/TRAINING PROGRAM

## 2022-08-03 RX ADMIN — SODIUM CHLORIDE, POTASSIUM CHLORIDE, SODIUM LACTATE AND CALCIUM CHLORIDE 1000 ML: 600; 310; 30; 20 INJECTION, SOLUTION INTRAVENOUS at 22:53

## 2022-08-04 VITALS
BODY MASS INDEX: 23.6 KG/M2 | OXYGEN SATURATION: 97 % | TEMPERATURE: 100.5 F | DIASTOLIC BLOOD PRESSURE: 60 MMHG | RESPIRATION RATE: 18 BRPM | SYSTOLIC BLOOD PRESSURE: 106 MMHG | WEIGHT: 125 LBS | HEART RATE: 104 BPM | HEIGHT: 61 IN

## 2022-08-04 LAB
HOLD SPECIMEN: NORMAL
HOLD SPECIMEN: NORMAL

## 2022-08-04 NOTE — ED PROVIDER NOTES
Subjective   47-year-old female comes to the ER with not feeling well over the last couple of days.  She endorses fatigue, chills, fever, body aches, headache.  Her children have tested positive for the coronavirus.  She thinks she caught it from them.  Nothing seems to make it better or worse.  She has been taking ibuprofen for the fever.      History provided by:  Patient   used: No        Review of Systems   Constitutional: Positive for activity change, appetite change, chills, fatigue and fever.   HENT: Negative for drooling.    Eyes: Negative for redness.   Respiratory: Negative for cough, choking, chest tightness, shortness of breath and wheezing.    Cardiovascular: Negative for chest pain and palpitations.   Gastrointestinal: Negative for abdominal pain, nausea and vomiting.   Genitourinary: Negative for flank pain.   Musculoskeletal: Positive for arthralgias and myalgias.   Skin: Negative for color change.   Neurological: Positive for headaches. Negative for dizziness, seizures and light-headedness.   Psychiatric/Behavioral: Negative for confusion.       Past Medical History:   Diagnosis Date   • Encounter for insertion of intrauterine contraceptive device      replace mirena      • History of female sterilization    • IUD check up    • Neuropathy    • Pain in pelvis    • PTSD (post-traumatic stress disorder)    • Restless leg syndrome    • Surgical follow-up care        No Known Allergies    Past Surgical History:   Procedure Laterality Date   • HYSTEROSCOPY TUBAL STERILIZATION  10/22/2014    Essure tubal sterilization precedure.   • INJECTION OF MEDICATION  09/03/2014    Dep Provera   • PAP SMEAR  03/10/2008    neg   • VAGINAL DELIVERY  04/05/2008       Family History   Problem Relation Age of Onset   • Diabetes Mother    • Heart disease Mother    • Rheum arthritis Father    • Breast cancer Neg Hx    • Colon cancer Neg Hx    • Endometrial cancer Neg Hx    • Ovarian cancer Neg Hx         Social History     Socioeconomic History   • Marital status:    Tobacco Use   • Smoking status: Never Smoker   • Smokeless tobacco: Never Used   Substance and Sexual Activity   • Alcohol use: No   • Drug use: No   • Sexual activity: Defer           Objective    Vitals:    08/03/22 2335 08/03/22 2348 08/04/22 0000 08/04/22 0009   BP:  104/55 107/57    Pulse: 110 110 108    Resp:    18   Temp:       TempSrc:       SpO2: 96% 95% 96%    Weight:       Height:           Physical Exam  Vitals and nursing note reviewed.   Constitutional:       General: She is not in acute distress.     Appearance: She is well-developed. She is ill-appearing. She is not toxic-appearing or diaphoretic.   HENT:      Head: Normocephalic.      Right Ear: External ear normal.      Left Ear: External ear normal.   Eyes:      General: No scleral icterus.     Conjunctiva/sclera: Conjunctivae normal.   Pulmonary:      Effort: Pulmonary effort is normal. No accessory muscle usage or respiratory distress.   Chest:      Chest wall: No tenderness.   Abdominal:      Palpations: Abdomen is soft.      Tenderness: There is no abdominal tenderness (deep palpation).   Skin:     General: Skin is warm and dry.      Capillary Refill: Capillary refill takes less than 2 seconds.   Neurological:      Mental Status: She is alert and oriented to person, place, and time.   Psychiatric:         Behavior: Behavior normal.         Procedures           ED Course      Results for orders placed or performed during the hospital encounter of 08/03/22   COVID-19 and FLU A/B PCR - Swab, Nasopharynx    Specimen: Nasopharynx; Swab   Result Value Ref Range    COVID19 Detected (C) Not Detected - Ref. Range    Influenza A PCR Not Detected Not Detected    Influenza B PCR Not Detected Not Detected   Comprehensive Metabolic Panel    Specimen: Blood   Result Value Ref Range    Glucose 317 (H) 65 - 99 mg/dL    BUN 9 6 - 20 mg/dL    Creatinine 0.59 0.57 - 1.00 mg/dL    Sodium  137 136 - 145 mmol/L    Potassium 3.6 3.5 - 5.2 mmol/L    Chloride 101 98 - 107 mmol/L    CO2 21.0 (L) 22.0 - 29.0 mmol/L    Calcium 8.6 8.6 - 10.5 mg/dL    Total Protein 6.8 6.0 - 8.5 g/dL    Albumin 4.20 3.50 - 5.20 g/dL    ALT (SGPT) 36 (H) 1 - 33 U/L    AST (SGOT) 29 1 - 32 U/L    Alkaline Phosphatase 87 39 - 117 U/L    Total Bilirubin 0.2 0.0 - 1.2 mg/dL    Globulin 2.6 gm/dL    A/G Ratio 1.6 g/dL    BUN/Creatinine Ratio 15.3 7.0 - 25.0    Anion Gap 15.0 5.0 - 15.0 mmol/L    eGFR 112.7 >60.0 mL/min/1.73   BNP    Specimen: Blood   Result Value Ref Range    proBNP 27.2 0.0 - 450.0 pg/mL   Troponin    Specimen: Blood   Result Value Ref Range    Troponin T <0.010 0.000 - 0.030 ng/mL   D-dimer, Quantitative    Specimen: Blood   Result Value Ref Range    D-Dimer, Quantitative 366 0 - 470 ng/mL (FEU)   CBC Auto Differential    Specimen: Blood   Result Value Ref Range    WBC 6.84 3.40 - 10.80 10*3/mm3    RBC 4.80 3.77 - 5.28 10*6/mm3    Hemoglobin 11.8 (L) 12.0 - 15.9 g/dL    Hematocrit 35.5 34.0 - 46.6 %    MCV 74.0 (L) 79.0 - 97.0 fL    MCH 24.6 (L) 26.6 - 33.0 pg    MCHC 33.2 31.5 - 35.7 g/dL    RDW 16.7 (H) 12.3 - 15.4 %    RDW-SD 44.1 37.0 - 54.0 fl    MPV 10.1 6.0 - 12.0 fL    Platelets 294 140 - 450 10*3/mm3    Neutrophil % 69.9 42.7 - 76.0 %    Lymphocyte % 14.3 (L) 19.6 - 45.3 %    Monocyte % 13.2 (H) 5.0 - 12.0 %    Eosinophil % 1.3 0.3 - 6.2 %    Basophil % 0.9 0.0 - 1.5 %    Immature Grans % 0.4 0.0 - 0.5 %    Neutrophils, Absolute 4.78 1.70 - 7.00 10*3/mm3    Lymphocytes, Absolute 0.98 0.70 - 3.10 10*3/mm3    Monocytes, Absolute 0.90 0.10 - 0.90 10*3/mm3    Eosinophils, Absolute 0.09 0.00 - 0.40 10*3/mm3    Basophils, Absolute 0.06 0.00 - 0.20 10*3/mm3    Immature Grans, Absolute 0.03 0.00 - 0.05 10*3/mm3    nRBC 0.0 0.0 - 0.2 /100 WBC   Gold Top - SST   Result Value Ref Range    Extra Tube Hold for add-ons.                 XR Chest 1 View   Final Result   Unremarkable portable chest radiograph.       Electronically signed by:  Jeyson Salazar MD  8/3/2022 11:22 PM CDT   Workstation: [id number]                                     MDM  Number of Diagnoses or Management Options  Coronavirus infection: new and requires workup  Diagnosis management comments: Vital signs are stable, afebrile.  Labs remarkable for positive COVID test, D-dimer troponin negative.  Chest x-ray shows no acute cardiopulmonary processes.  Heart rate improved with IVF bolus.  Recommend follow-up with her PCP.  Patient satting well on room air.  Return precautions given.  Patient states understanding and is agreeable to the plan.      Final diagnoses:   Coronavirus infection       ED Disposition  ED Disposition     ED Disposition   Discharge    Condition   Stable    Comment   --             Kieran Valle MD  [address] [facility] [id number] [phone]    Schedule an appointment as soon as possible for a visit in 2 days  ER follow up         Medication List      No changes were made to your prescriptions during this visit.          Christiano Marshall MD  08/04/22 0010

## 2022-09-13 ENCOUNTER — TELEPHONE (OUTPATIENT)
Dept: ENDOCRINOLOGY | Facility: CLINIC | Age: 47
End: 2022-09-13

## 2022-09-13 RX ORDER — FLUCONAZOLE 150 MG/1
150 TABLET ORAL ONCE
Qty: 1 TABLET | Refills: 0 | Status: SHIPPED | OUTPATIENT
Start: 2022-09-13 | End: 2022-09-13

## 2022-09-13 NOTE — TELEPHONE ENCOUNTER
Pt called and stated she needs a refill on her diflucan. I didn't see it on her med list.    Thanks

## 2022-09-22 ENCOUNTER — TELEPHONE (OUTPATIENT)
Dept: FAMILY MEDICINE CLINIC | Facility: CLINIC | Age: 47
End: 2022-09-22

## 2022-09-22 RX ORDER — FLUCONAZOLE 150 MG/1
TABLET ORAL
Qty: 2 TABLET | Refills: 0 | Status: SHIPPED | OUTPATIENT
Start: 2022-09-22 | End: 2022-11-07

## 2022-09-22 NOTE — TELEPHONE ENCOUNTER
Carol called and wanted to know if you would send her in a Diflucan she has a yeast infection.  [facility]

## 2022-11-07 ENCOUNTER — OFFICE VISIT (OUTPATIENT)
Dept: FAMILY MEDICINE CLINIC | Facility: CLINIC | Age: 47
End: 2022-11-07

## 2022-11-07 ENCOUNTER — LAB (OUTPATIENT)
Dept: LAB | Facility: HOSPITAL | Age: 47
End: 2022-11-07

## 2022-11-07 VITALS
TEMPERATURE: 97.1 F | SYSTOLIC BLOOD PRESSURE: 118 MMHG | WEIGHT: 125 LBS | HEART RATE: 117 BPM | BODY MASS INDEX: 23.6 KG/M2 | DIASTOLIC BLOOD PRESSURE: 81 MMHG | OXYGEN SATURATION: 98 % | HEIGHT: 61 IN

## 2022-11-07 DIAGNOSIS — Z79.4 TYPE 2 DIABETES MELLITUS WITH HYPERGLYCEMIA, WITH LONG-TERM CURRENT USE OF INSULIN: ICD-10-CM

## 2022-11-07 DIAGNOSIS — F32.A DEPRESSION, UNSPECIFIED DEPRESSION TYPE: Primary | ICD-10-CM

## 2022-11-07 DIAGNOSIS — E11.65 TYPE 2 DIABETES MELLITUS WITH HYPERGLYCEMIA, WITH LONG-TERM CURRENT USE OF INSULIN: ICD-10-CM

## 2022-11-07 DIAGNOSIS — R41.3 MEMORY DIFFICULTY: ICD-10-CM

## 2022-11-07 PROCEDURE — 83036 HEMOGLOBIN GLYCOSYLATED A1C: CPT | Performed by: FAMILY MEDICINE

## 2022-11-07 PROCEDURE — 99213 OFFICE O/P EST LOW 20 MIN: CPT | Performed by: FAMILY MEDICINE

## 2022-11-07 RX ORDER — VENLAFAXINE HYDROCHLORIDE 37.5 MG/1
37.5 CAPSULE, EXTENDED RELEASE ORAL DAILY
Qty: 30 CAPSULE | Refills: 0 | Status: SHIPPED | OUTPATIENT
Start: 2022-11-07

## 2022-11-07 NOTE — PROGRESS NOTES
" Subjective   Carol Perez is a 47 y.o. female.     Chief Complaint   Patient presents with   • Depression             History of Present Illness     Since covid, brain fog  Also  No emotion  Feels numb.   Her last  hga1c was 15.4 6/3/22.    On zoloft 50mg qd. Not suicidal      Review of Systems   Constitutional: Negative for chills, fatigue and fever.   HENT: Negative for congestion, ear discharge, ear pain, facial swelling, hearing loss, postnasal drip, rhinorrhea, sinus pressure, sore throat, trouble swallowing and voice change.    Eyes: Negative for discharge, redness and visual disturbance.   Respiratory: Negative for cough, chest tightness, shortness of breath and wheezing.    Cardiovascular: Negative for chest pain and palpitations.   Gastrointestinal: Negative for abdominal pain, blood in stool, constipation, diarrhea, nausea and vomiting.   Endocrine: Negative for polydipsia and polyuria.   Genitourinary: Negative for dysuria, flank pain, hematuria and urgency.   Musculoskeletal: Negative for arthralgias, back pain, joint swelling and myalgias.   Skin: Negative for rash.   Neurological: Negative for dizziness, weakness, numbness and headaches.   Hematological: Negative for adenopathy.   Psychiatric/Behavioral: Positive for dysphoric mood. Negative for confusion and sleep disturbance. The patient is not nervous/anxious.            /81 (BP Location: Left arm, Patient Position: Sitting, Cuff Size: Adult)   Pulse 117   Temp 97.1 °F (36.2 °C) (Infrared)   Ht 154.9 cm (60.98\")   Wt 56.7 kg (125 lb)   SpO2 98%   BMI 23.63 kg/m²       Objective     Physical Exam  Vitals and nursing note reviewed.   Constitutional:       Appearance: Normal appearance. She is well-developed.   HENT:      Head: Normocephalic and atraumatic.      Right Ear: External ear normal.      Left Ear: External ear normal.      Nose: Nose normal.   Eyes:      Extraocular Movements: Extraocular movements intact.      " Conjunctiva/sclera: Conjunctivae normal.      Pupils: Pupils are equal, round, and reactive to light.   Pulmonary:      Effort: Pulmonary effort is normal.   Musculoskeletal:         General: Normal range of motion.      Cervical back: Normal range of motion.   Neurological:      General: No focal deficit present.      Mental Status: She is alert and oriented to person, place, and time.   Psychiatric:         Behavior: Behavior normal.         Thought Content: Thought content normal.         Judgment: Judgment normal.             PAST MEDICAL HISTORY     Past Medical History:   Diagnosis Date   • Encounter for insertion of intrauterine contraceptive device      replace mirena      • History of female sterilization    • IUD check up    • Neuropathy    • Pain in pelvis    • PTSD (post-traumatic stress disorder)    • Restless leg syndrome    • Surgical follow-up care       PAST SURGICAL HISTORY     Past Surgical History:   Procedure Laterality Date   • HYSTEROSCOPY TUBAL STERILIZATION  10/22/2014    Essure tubal sterilization precedure.   • INJECTION OF MEDICATION  09/03/2014    Dep Provera   • PAP SMEAR  03/10/2008    neg   • VAGINAL DELIVERY  04/05/2008      SOCIAL HISTORY     Social History     Socioeconomic History   • Marital status:    Tobacco Use   • Smoking status: Never   • Smokeless tobacco: Never   Substance and Sexual Activity   • Alcohol use: No   • Drug use: No   • Sexual activity: Defer      ALLERGIES   Patient has no known allergies.   MEDICATIONS     Current Outpatient Medications   Medication Sig Dispense Refill   • amitriptyline (ELAVIL) 10 MG tablet Take 10 mg by mouth Every Night.     • Blood Glucose Monitoring Suppl (FreeStyle Lite) w/Device kit 4 TIMES DAILY     • glucose blood (FREESTYLE TEST STRIPS) test strip 1 each by Other route 4 (Four) Times a Day. 200 each 12   • glucose blood test strip Test qid. SHE NEEDS BLOOD GLUCOSE METER AS WELL. E11.65 AND Z79.4 150 each 12   • Insulin  "Glargine (LANTUS SOLOSTAR) 100 UNIT/ML injection pen Inject 15 Units under the skin into the appropriate area as directed Daily. Adjust per sliding scale. 9 pen 0   • Insulin Lispro, 1 Unit Dial, (HumaLOG KwikPen) 100 UNIT/ML solution pen-injector Inject 6 Units under the skin into the appropriate area as directed 3 (Three) Times a Day Before Meals. 9 mL 0   • Insulin Pen Needle (AboutTime Pen Needle) 32G X 4 MM misc Inject 4 times daily 120 each 11   • Insulin Syringe 30G X 5/16\" 1 ML misc 1 each 2 (Two) Times a Day. 100 each 11   • Lancets (FREESTYLE) lancets 1 each by Other route 2 (Two) Times a Day As Needed (sugar). 100 each 12   • pravastatin (Pravachol) 40 MG tablet Take 1 tablet by mouth Every Night. 90 tablet 1   • vitamin D (ERGOCALCIFEROL) 1.25 MG (24438 UT) capsule capsule Take 1 capsule by mouth 1 (One) Time Per Week. 12 capsule 3   • venlafaxine XR (Effexor XR) 37.5 MG 24 hr capsule Take 1 capsule by mouth Daily. TAKE IN THE MORNINGS. AFTER STARTING, STOP ZOLOFT 30 capsule 0     No current facility-administered medications for this visit.        The following portions of the patient's history were reviewed and updated as appropriate: allergies, current medications, past family history, past medical history, past social history, past surgical history and problem list.        Assessment & Plan   Diagnoses and all orders for this visit:    1. Depression, unspecified depression type (Primary)    2. Type 2 diabetes mellitus with hyperglycemia, with long-term current use of insulin ([facility])  -     Hemoglobin A1c    3. Memory difficulty    Other orders  -     venlafaxine XR (Effexor XR) 37.5 MG 24 hr capsule; Take 1 capsule by mouth Daily. TAKE IN THE MORNINGS. AFTER STARTING, STOP ZOLOFT  Dispense: 30 capsule; Refill: 0      A lot of her memory/brain fog could be explained by her uncontrolled diabetes.   Will check hga1c today.   Appointment with endocrinology not until 1/10/23.     For depression/apathy will " stop zoloft.  Instead start effexor xr 37.5mg qam.   Follow up in 2 weeks.                No follow-ups on file.                  This document has been electronically signed by Kieran Valle MD on November 7, 2022 16:29 CST

## 2022-11-08 LAB — HBA1C MFR BLD: 16 % (ref 4.8–5.6)

## 2022-11-09 ENCOUNTER — SPECIALTY PHARMACY (OUTPATIENT)
Dept: ENDOCRINOLOGY | Facility: CLINIC | Age: 47
End: 2022-11-09

## 2022-11-09 ENCOUNTER — OFFICE VISIT (OUTPATIENT)
Dept: ENDOCRINOLOGY | Facility: CLINIC | Age: 47
End: 2022-11-09

## 2022-11-09 VITALS
HEIGHT: 61 IN | SYSTOLIC BLOOD PRESSURE: 120 MMHG | BODY MASS INDEX: 24.02 KG/M2 | WEIGHT: 127.2 LBS | DIASTOLIC BLOOD PRESSURE: 80 MMHG | HEART RATE: 92 BPM | OXYGEN SATURATION: 98 %

## 2022-11-09 DIAGNOSIS — E11.42 DIABETIC POLYNEUROPATHY ASSOCIATED WITH TYPE 2 DIABETES MELLITUS: ICD-10-CM

## 2022-11-09 DIAGNOSIS — Z79.4 TYPE 2 DIABETES MELLITUS WITH HYPERGLYCEMIA, WITH LONG-TERM CURRENT USE OF INSULIN: Primary | ICD-10-CM

## 2022-11-09 DIAGNOSIS — E55.9 VITAMIN D DEFICIENCY: ICD-10-CM

## 2022-11-09 DIAGNOSIS — E11.65 TYPE 2 DIABETES MELLITUS WITH HYPERGLYCEMIA, WITH LONG-TERM CURRENT USE OF INSULIN: Primary | ICD-10-CM

## 2022-11-09 DIAGNOSIS — E78.2 MIXED HYPERLIPIDEMIA: ICD-10-CM

## 2022-11-09 PROCEDURE — 99214 OFFICE O/P EST MOD 30 MIN: CPT | Performed by: NURSE PRACTITIONER

## 2022-11-09 NOTE — PROGRESS NOTES
"Chief Complaint  Diabetes    Subjective          Carol Perez presents to [facility] for   History of Present Illness     In office visit     47 year old female presents for follow up      Reason diabetes mellitus type 2    Duration diagnosed in 2017    Timing constant    Quality not controlled     Severity - high due to hyperglycemia     States cannot remember any thing since she had COVID         Microvascular-- neuropathy         Context--presented with symptoms and lab work revealed she did have diabetes     She did have gestational diabetes            Diabetes Regimen--        insulin              Blood Glucose Readings     Checking 2 times daily                            Objective   Vital Signs:   /80   Pulse 92   Ht 154.9 cm (61\")   Wt 57.7 kg (127 lb 3.2 oz)   SpO2 98%   BMI 24.03 kg/m²     Physical Exam  Constitutional:       Appearance: Normal appearance.   HENT:      Head: Normocephalic.      Right Ear: External ear normal.      Left Ear: External ear normal.   Eyes:      Conjunctiva/sclera: Conjunctivae normal.      Pupils: Pupils are equal, round, and reactive to light.   Cardiovascular:      Rate and Rhythm: Regular rhythm.      Heart sounds: Normal heart sounds.   Pulmonary:      Breath sounds: Normal breath sounds.   Musculoskeletal:         General: Normal range of motion.      Cervical back: Normal range of motion and neck supple.   Skin:     Coloration: Skin is not pale.   Neurological:      General: No focal deficit present.      Mental Status: She is alert.   Psychiatric:         Mood and Affect: Mood normal.         Thought Content: Thought content normal.         Judgment: Judgment normal.        Result Review :   The following data was reviewed by: TRACI Salazar on 01/28/2021:  CMP    CMP 3/28/22 6/3/22 8/3/22   Glucose 623 (A) 242 (A) 317 (A)   BUN 8 9 9   Creatinine 0.88 0.55 (A) 0.59   Sodium 135 (A) 140 137 "   Potassium 4.3 3.8 3.6   Chloride 99 104 101   Calcium 9.2 10.1 8.6   Albumin 4.40 4.50 4.20   Total Bilirubin 0.2 <0.2 0.2   Alkaline Phosphatase 98 98 87   AST (SGOT) 14 28 29   ALT (SGPT) 19 26 36 (A)   (A) Abnormal value            CBC    CBC 3/28/22 6/3/22 8/3/22   WBC 5.67 6.71 6.84   RBC 4.82 5.08 4.80   Hemoglobin 12.1 12.1 11.8 (A)   Hematocrit 40.0 37.7 35.5   MCV 83.0 74.2 (A) 74.0 (A)   MCH 25.1 (A) 23.8 (A) 24.6 (A)   MCHC 30.3 (A) 32.1 33.2   RDW 14.1 15.0 16.7 (A)   Platelets 341 327 294   (A) Abnormal value            Lipid Panel    Lipid Panel 3/28/22   Total Cholesterol 320 (A)   Triglycerides 341 (A)   HDL Cholesterol 44   VLDL Cholesterol 70 (A)   LDL Cholesterol  206 (A)   LDL/HDL Ratio 4.72   (A) Abnormal value            TSH    TSH 3/28/22   TSH 1.290           A1C Last 3 Results    HGBA1C Last 3 Results 3/28/22 6/3/22 11/7/22   Hemoglobin A1C 14.60 (A) 15.40 (A) 16.00 (A)   (A) Abnormal value                          Assessment and Plan    Problem List Items Addressed This Visit        Other    Type 2 diabetes mellitus with hyperglycemia, with long-term current use of insulin (HCC) - Primary    Diabetic polyneuropathy associated with type 2 diabetes mellitus (HCC)    Vitamin D deficiency    Mixed hyperlipidemia   Glycemic Management     Diabetes mellitus type 2         Lab Results   Component Value Date    HGBA1C 16.00 (H) 11/07/2022     She is forgetting to take insulin       Taking lantus 26 units - change to tresiba      If you wake up with a blood glucose level less than 80 decrease by 4 units            Taking Humalog -- call in the inpen so she can have reminders to bolus and she can look and see if she gave insulin for her meals          Taking 10 units up to  14 units TID     +       Sliding scale       Blood sugar  Insulin       200-250 2 units   251-300 4 units   301-350 6 units   Above 351 8 units    ---        Goals for sugar     Fasting and before meals 80 to 130     2 hours  after meals 180 or less        Aim for 45 grams of carbohydrate per meal     Aim for 15 grams of carbohydrate per snack           Previous regimen      Metformin stopped due to side effects         Novolin 70/30 units -- taking 55 units at night -she is not taking the daytime dosage--- stop today and change to the following                    Lipid Management              Hyperlipidemia       Taking  Crestor 10 mg one night     Lab Results   Component Value Date     (H) 03/28/2022             Blood Pressure Management     Not on ACEi              Microvascular Complication Monitoring        Last eye exam July 2020 , no DR            Component      Latest Ref Rng & Units 10/27/2020 10/27/2020          11:57 AM 11:57 AM   Protein/Creatinine Ratio      0.0 - 200.0 mg/G Crea 177.9     Creatinine, Urine      mg/dL 50.6 50.6   Total Protein, Urine      mg/dL 9.0     Microalbumin/Creatinine Ratio      mg/g   39.5   Microalbumin, Urine      mg/dL   2.0         Neuropathy -  Bilateral feet       Taking Elavil 10 mg 1 at night     Bone Health            vitamin d def.             TAKING  50,000 units once weekly      Other Diabetes Related Aspects    Lab Results   Component Value Date    [id number] 528 01/26/2021          Thyroid health        Lab Results   Component Value Date    TSH 1.290 03/28/2022                Follow Up   No follow-ups on file.  Patient was given instructions and counseling regarding her condition or for health maintenance advice. Please see specific information pulled into the AVS if appropriate.

## 2022-11-09 NOTE — PROGRESS NOTES
Specialty Pharmacy Program - Endocrinology       Carol Perez is a 47 y.o. female with Type 2 Diabetes enrolled in the Endocrinology Patient Management program offered by [facility].  An initial outreach was conducted, including assessment of therapy appropriateness and specialty medication education for InPen tresiba. The patient was introduced to services offered by our specialty pharmacy program, including: regular assessments, refill coordination, curbside pick-up or mail order delivery options, prior authorization maintenance, and financial assistance programs as applicable. The patient was also provided with contact information for the pharmacy team.     Insurance Coverage & Financial Support  Medicaid    Relevant Past Medical History and Comorbidities  Relevant medical history and concomitant health conditions were discussed with the patient. The patient's chart has been reviewed for relevant past medical history and comorbid health conditions and updated as necessary.   Past Medical History:   Diagnosis Date   • Encounter for insertion of intrauterine contraceptive device      replace mirena      • History of female sterilization    • IUD check up    • Neuropathy    • Pain in pelvis    • PTSD (post-traumatic stress disorder)    • Restless leg syndrome    • Surgical follow-up care      Social History     Socioeconomic History   • Marital status:    Tobacco Use   • Smoking status: Never   • Smokeless tobacco: Never   Substance and Sexual Activity   • Alcohol use: No   • Drug use: No   • Sexual activity: Defer         Allergies  Known allergies and reactions were discussed with the patient. The patient's chart has been reviewed for  allergy information and updated as necessary.   Patient has no known allergies.       Current Medication List  This medication list has been reviewed with the patient and evaluated for any interactions or necessary  "modifications/recommendations, and updated to include all prescription medications, OTC medications, and supplements the patient is currently taking.  This list reflects what is contained in the patient's profile, which has also been marked as reviewed to communicate to other providers it is the most up to date version of the patient's current medication therapy.     Current Outpatient Medications:   •  amitriptyline (ELAVIL) 10 MG tablet, Take 10 mg by mouth Every Night., Disp: , Rfl:   •  Blood Glucose Monitoring Suppl (FreeStyle Lite) w/Device kit, 4 TIMES DAILY, Disp: , Rfl:   •  glucose blood (FREESTYLE TEST STRIPS) test strip, 1 each by Other route 4 (Four) Times a Day., Disp: 200 each, Rfl: 12  •  glucose blood test strip, Test qid. SHE NEEDS BLOOD GLUCOSE METER AS WELL. E11.65 AND Z79.4, Disp: 150 each, Rfl: 12  •  Insulin Glargine (LANTUS SOLOSTAR) 100 UNIT/ML injection pen, Inject 15 Units under the skin into the appropriate area as directed Daily. Adjust per sliding scale., Disp: 9 pen, Rfl: 0  •  Insulin Lispro, 1 Unit Dial, (HumaLOG KwikPen) 100 UNIT/ML solution pen-injector, Inject 6 Units under the skin into the appropriate area as directed 3 (Three) Times a Day Before Meals., Disp: 9 mL, Rfl: 0  •  Insulin Pen Needle (AboutTime Pen Needle) 32G X 4 MM misc, Inject 4 times daily, Disp: 120 each, Rfl: 11  •  Insulin Syringe 30G X 5/16\" 1 ML misc, 1 each 2 (Two) Times a Day., Disp: 100 each, Rfl: 11  •  Lancets (FREESTYLE) lancets, 1 each by Other route 2 (Two) Times a Day As Needed (sugar)., Disp: 100 each, Rfl: 12  •  pravastatin (Pravachol) 40 MG tablet, Take 1 tablet by mouth Every Night., Disp: 90 tablet, Rfl: 1  •  venlafaxine XR (Effexor XR) 37.5 MG 24 hr capsule, Take 1 capsule by mouth Daily. TAKE IN THE MORNINGS. AFTER STARTING, STOP ZOLOFT, Disp: 30 capsule, Rfl: 0  •  vitamin D (ERGOCALCIFEROL) 1.25 MG (91441 UT) capsule capsule, Take 1 capsule by mouth 1 (One) Time Per Week., Disp: 12 " capsule, Rfl: 3         Drug Interactions  None noted     Relevant Laboratory Values  A1C Last 3 Results    HGBA1C Last 3 Results 3/28/22 6/3/22 11/7/22   Hemoglobin A1C 14.60 (A) 15.40 (A) 16.00 (A)   (A) Abnormal value            Lab Results   Component Value Date    HGBA1C 16.00 (H) 11/07/2022     Lab Results   Component Value Date    GLUCOSE 317 (H) 08/03/2022    CALCIUM 8.6 08/03/2022     08/03/2022    K 3.6 08/03/2022    CO2 21.0 (L) 08/03/2022     08/03/2022    BUN 9 08/03/2022    CREATININE 0.59 08/03/2022    EGFRIFNONA 88 01/26/2021    BCR 15.3 08/03/2022    ANIONGAP 15.0 08/03/2022     Lab Results   Component Value Date    CHOL 320 (H) 03/28/2022    TRIG 341 (H) 03/28/2022    HDL 44 03/28/2022     (H) 03/28/2022         Initial Education Provided for Specialty Medication  The patient has been provided with the following education and any applicable administration techniques (i.e. self-injection) have been demonstrated for the therapies indicated. All questions and concerns have been addressed prior to the patient receiving the medication, and the patient has verbalized understanding of the education and any materials provided.  Additional patient education shall be provided and documented upon request by the patient, provider or payer.        Adherence and Self-Administration  • Barriers to Patient Adherence and/or Self-Administration: Forgetful; patient states having trouble remember since COVID    • Methods for Supporting Patient Adherence and/or Self-Administration: Meetings with [facility]    Goals of Therapy  Goal is to remember taking insulin and lower A1c.     Reassessment Plan & Follow-Up  1. Medication Therapy Changes: Adding InPen for humalog, switching from lantus to tresiba.    2. Additional Plans, Therapy Recommendations, or Therapy Problems to Be Addressed: Adherence; follow up closely    3. Pharmacist to perform regular reassessments no more than (6) months from the previous  assessment.  4. Welcome information and patient satisfaction survey to be sent by retail team with patient's initial fill.  5. Care Coordinator to set up future refill outreaches, coordinate prescription delivery, and escalate clinical questions to pharmacist.     Attestation  I attest that the initiated specialty medication(s) are appropriate for the patient based on my assessment.  If the prescribed therapy is at any point deemed not appropriate based on the current or future assessments, a consultation will be initiated with the patient's specialty care provider to determine the best course of action. The revised plan of therapy will be documented along with any additional patient education provided.     Ophelia Dillard, PharmD  11/9/2022  16:06 CST

## 2022-11-10 RX ORDER — INSULIN PEN,REUSABLE,BT LISPRO
1 INSULIN PEN (EA) SUBCUTANEOUS ONCE
Qty: 1 EACH | Refills: 0 | Status: SHIPPED | OUTPATIENT
Start: 2022-11-10 | End: 2022-11-10

## 2022-11-10 RX ORDER — INSULIN PEN,REUSABLE,BT LISPRO
INSULIN PEN (EA) SUBCUTANEOUS
Qty: 1 EACH | Refills: 0 | Status: SHIPPED | OUTPATIENT
Start: 2022-11-10

## 2022-11-14 ENCOUNTER — SPECIALTY PHARMACY (OUTPATIENT)
Dept: ENDOCRINOLOGY | Facility: CLINIC | Age: 47
End: 2022-11-14

## 2022-11-14 NOTE — PROGRESS NOTES
Specialty Pharmacy Refill Coordination Note     Inpen PA was denied so it was sent to [facility].  Pt's tresiba and humalog cartridges are PA approved.  She is going to  the tresiba 11/14/22 in the pharmacy.                    Follow-up: 1 month.      Ventura Marshall  Specialty Pharmacy Technician

## 2022-11-21 ENCOUNTER — DOCUMENTATION (OUTPATIENT)
Dept: ENDOCRINOLOGY | Facility: CLINIC | Age: 47
End: 2022-11-21

## 2022-11-23 ENCOUNTER — OFFICE VISIT (OUTPATIENT)
Dept: ENDOCRINOLOGY | Facility: CLINIC | Age: 47
End: 2022-11-23

## 2022-11-23 DIAGNOSIS — E11.65 TYPE 2 DIABETES MELLITUS WITH HYPERGLYCEMIA, WITH LONG-TERM CURRENT USE OF INSULIN: ICD-10-CM

## 2022-11-23 DIAGNOSIS — Z79.4 TYPE 2 DIABETES MELLITUS WITH HYPERGLYCEMIA, WITH LONG-TERM CURRENT USE OF INSULIN: ICD-10-CM

## 2022-11-23 PROCEDURE — 98960 EDU&TRN PT SELF-MGMT NQHP 1: CPT | Performed by: DIETITIAN, REGISTERED

## 2022-11-23 NOTE — PROGRESS NOTES
Carol Perez is a 47 y.o. female referred for outpatient diabetes education by Darshan AVILES. Patient attended individual for education for 30 minutes on 11/23/2022. Topics covered included:     1. Healthy Food Choices   i. Provided patient with detailed carbohydrate counting guide.    ii. Instructed patient to eat 45-60 grams of carbohydrate with each meal (3-4 exchange choices) and 15 grams of carb for snacks (1 exchange choices).   iii. Provided patient with list of non-starchy vegetables and foods that are low in carbohydrate for snacks and to incorporate with meals (Planning Healthy Meals Packet).    iv. Choose fruits, vegetables, whole grains, legumes, low-fat milk, fiber-rich foods, minimal saturated fats, and watch cholesterol and sodium intake.    v. Reviewed carbohydrate-containing foods, standard serving sizes, and measuring foods.   vi. Reviewed the difference between simple and complex carbohydrate. Encouraged patient to choose complex carbohydrates more often.   vii. Reviewed label reading. Discussed looking at serving size, total carbohydrates, fiber, saturated fat, sodium. Reviewed  amounts of each to hav per day & per meal.      2. Pt reports InPen is currently in the mail on the way to her. Pt will call when it arrives to schedule InPen training.     Provided patient with Diabetes and You book, and Planning Healthy Meals book. Provided handout of sample menu with carbs listed.      Thank you Darshan AVILES for this referral.      DIANA Moreland, RD, LD

## 2022-12-06 ENCOUNTER — OFFICE VISIT (OUTPATIENT)
Dept: ENDOCRINOLOGY | Facility: CLINIC | Age: 47
End: 2022-12-06

## 2022-12-06 VITALS
HEART RATE: 97 BPM | BODY MASS INDEX: 24.52 KG/M2 | DIASTOLIC BLOOD PRESSURE: 76 MMHG | OXYGEN SATURATION: 99 % | SYSTOLIC BLOOD PRESSURE: 110 MMHG | WEIGHT: 129.9 LBS | HEIGHT: 61 IN

## 2022-12-06 DIAGNOSIS — E78.2 MIXED HYPERLIPIDEMIA: ICD-10-CM

## 2022-12-06 DIAGNOSIS — E11.42 DIABETIC POLYNEUROPATHY ASSOCIATED WITH TYPE 2 DIABETES MELLITUS: ICD-10-CM

## 2022-12-06 DIAGNOSIS — Z79.4 TYPE 2 DIABETES MELLITUS WITH HYPERGLYCEMIA, WITH LONG-TERM CURRENT USE OF INSULIN: Primary | ICD-10-CM

## 2022-12-06 DIAGNOSIS — E55.9 VITAMIN D DEFICIENCY: ICD-10-CM

## 2022-12-06 DIAGNOSIS — E11.65 TYPE 2 DIABETES MELLITUS WITH HYPERGLYCEMIA, WITH LONG-TERM CURRENT USE OF INSULIN: Primary | ICD-10-CM

## 2022-12-06 PROCEDURE — 99214 OFFICE O/P EST MOD 30 MIN: CPT | Performed by: NURSE PRACTITIONER

## 2022-12-06 RX ORDER — PROCHLORPERAZINE 25 MG/1
1 SUPPOSITORY RECTAL
Qty: 1 EACH | Refills: 3 | Status: SHIPPED | OUTPATIENT
Start: 2022-12-06

## 2022-12-06 RX ORDER — PROCHLORPERAZINE 25 MG/1
1 SUPPOSITORY RECTAL AS NEEDED
Qty: 9 EACH | Refills: 3 | Status: SHIPPED | OUTPATIENT
Start: 2022-12-06

## 2022-12-06 RX ORDER — PROCHLORPERAZINE 25 MG/1
1 SUPPOSITORY RECTAL ONCE
Qty: 1 EACH | Refills: 1 | Status: SHIPPED | OUTPATIENT
Start: 2022-12-06 | End: 2022-12-06

## 2022-12-06 RX ORDER — INSULIN LISPRO 100 [IU]/ML
INJECTION, SOLUTION INTRAVENOUS; SUBCUTANEOUS
Qty: 9 ML | Refills: 11 | Status: SHIPPED | OUTPATIENT
Start: 2022-12-06

## 2022-12-06 NOTE — PATIENT INSTRUCTIONS
Taking  tresiba 26 units ---increase to 28 units      If you wake up with a blood glucose level less than 80 decrease by 4 units   
yes

## 2022-12-06 NOTE — PROGRESS NOTES
"Chief Complaint  Diabetes    Subjective          Carol Perez presents to [facility] for   Diabetes       In office visit     47 year old female presents for follow up      Reason diabetes mellitus type 2    Duration diagnosed in 2017    Timing constant    Quality not controlled     Severity - high due to hyperglycemia     States cannot remember any thing since she had COVID         Microvascular-- neuropathy         Context--presented with symptoms and lab work revealed she did have diabetes     She did have gestational diabetes            Diabetes Regimen--        insulin              Blood Glucose Readings     Checking 4 times daily     Readings are under 300 now no longer up to 500             Review of Systems - General ROS: negative                    Objective   Vital Signs:   /76   Pulse 97   Ht 154.9 cm (61\")   Wt 58.9 kg (129 lb 14.4 oz)   SpO2 99%   BMI 24.54 kg/m²     Physical Exam  Constitutional:       Appearance: Normal appearance.   HENT:      Head: Normocephalic.      Right Ear: External ear normal.      Left Ear: External ear normal.   Eyes:      Conjunctiva/sclera: Conjunctivae normal.      Pupils: Pupils are equal, round, and reactive to light.   Cardiovascular:      Rate and Rhythm: Regular rhythm.      Heart sounds: Normal heart sounds.   Pulmonary:      Breath sounds: Normal breath sounds.   Musculoskeletal:         General: Normal range of motion.      Cervical back: Normal range of motion and neck supple.   Neurological:      Mental Status: She is alert.        Result Review :   The following data was reviewed by: TRACI Salazar on 01/28/2021:  CMP    CMP 3/28/22 6/3/22 8/3/22   Glucose 623 (A) 242 (A) 317 (A)   BUN 8 9 9   Creatinine 0.88 0.55 (A) 0.59   Sodium 135 (A) 140 137   Potassium 4.3 3.8 3.6   Chloride 99 104 101   Calcium 9.2 10.1 8.6   Albumin 4.40 4.50 4.20   Total Bilirubin 0.2 <0.2 0.2   Alkaline " Phosphatase 98 98 87   AST (SGOT) 14 28 29   ALT (SGPT) 19 26 36 (A)   (A) Abnormal value            CBC    CBC 3/28/22 6/3/22 8/3/22   WBC 5.67 6.71 6.84   RBC 4.82 5.08 4.80   Hemoglobin 12.1 12.1 11.8 (A)   Hematocrit 40.0 37.7 35.5   MCV 83.0 74.2 (A) 74.0 (A)   MCH 25.1 (A) 23.8 (A) 24.6 (A)   MCHC 30.3 (A) 32.1 33.2   RDW 14.1 15.0 16.7 (A)   Platelets 341 327 294   (A) Abnormal value            Lipid Panel    Lipid Panel 3/28/22   Total Cholesterol 320 (A)   Triglycerides 341 (A)   HDL Cholesterol 44   VLDL Cholesterol 70 (A)   LDL Cholesterol  206 (A)   LDL/HDL Ratio 4.72   (A) Abnormal value            TSH    TSH 3/28/22   TSH 1.290           A1C Last 3 Results    HGBA1C Last 3 Results 3/28/22 6/3/22 11/7/22   Hemoglobin A1C 14.60 (A) 15.40 (A) 16.00 (A)   (A) Abnormal value                          Assessment and Plan    Problem List Items Addressed This Visit        Other    Type 2 diabetes mellitus with hyperglycemia, with long-term current use of insulin ([facility]) - Primary    Relevant Medications    Insulin Lispro (HumaLOG) 100 UNIT/ML solution cartridge    Other Relevant Orders    CBC & Differential    Comprehensive Metabolic Panel    Hemoglobin A1c    TSH    Diabetic polyneuropathy associated with type 2 diabetes mellitus (HCC)    Relevant Medications    Insulin Lispro (HumaLOG) 100 UNIT/ML solution cartridge    Other Relevant Orders    CBC & Differential    Comprehensive Metabolic Panel    Hemoglobin A1c    TSH    Vitamin D deficiency    Relevant Orders    CBC & Differential    Comprehensive Metabolic Panel    Hemoglobin A1c    TSH    Mixed hyperlipidemia    Relevant Orders    CBC & Differential    Comprehensive Metabolic Panel    Hemoglobin A1c    TSH   Glycemic Management     Diabetes mellitus type 2         Lab Results   Component Value Date    HGBA1C 16.00 (H) 11/07/2022     She did get the INPEN       Taking  tresiba 26 units ---increase to 28 units      If you wake up with a blood glucose level  less than 80 decrease by 4 units            Taking Humalog          Taking 10 units up to  14 units TID --increase to 16 units     +       Sliding scale       Blood sugar  Insulin       200-250 2 units   251-300 4 units   301-350 6 units   Above 351 8 units    ---        Goals for sugar     Fasting and before meals 80 to 130     2 hours after meals 180 or less        Aim for 45 grams of carbohydrate per meal     Aim for 15 grams of carbohydrate per snack           Previous regimen      Metformin stopped due to side effects         Novolin 70/30 units -- taking 55 units at night -she is not taking the daytime dosage--- stop today and change to the following            approve dexcom G6     The patient has diabetes mellitus, insulin-dependent.     Our Diabetes Department has evaluated the patient in the last six months and will continue counseling on insulin adjustment.      The patient performs blood glucose testing at least four times daily with proven glucose variability from 50 to 300 mg per dl.     The patient is administering basal insulin and prandial insulin four times per day for more than six months.     The patient uses a home blood glucose monitor to assess blood glucose at least four times daily for more than six months.     The patient requires frequent adjustment of insulin treatment regimen based on blood glucose readings.     The patient has frequent variability in blood glucose readings due to activity and variability in meal content and time.      The patient has completed a diabetes education program with us.     The patient has demonstrated the ability to self-monitor her glucose.      The patient is motivated in improving diabetes control      The patient has hypoglycemia unawareness         Lipid Management              Hyperlipidemia       Taking  Crestor 10 mg one night     Lab Results   Component Value Date     (H) 03/28/2022             Blood Pressure Management     Not on ACEi               Microvascular Complication Monitoring        Last eye exam July 2020 , no DR            Component      Latest Ref Rng & Units 10/27/2020 10/27/2020          11:57 AM 11:57 AM   Protein/Creatinine Ratio      0.0 - 200.0 mg/G Crea 177.9     Creatinine, Urine      mg/dL 50.6 50.6   Total Protein, Urine      mg/dL 9.0     Microalbumin/Creatinine Ratio      mg/g   39.5   Microalbumin, Urine      mg/dL   2.0         Neuropathy -  Bilateral feet       Taking Elavil 10 mg 1 at night     Bone Health            vitamin d def.             TAKING  50,000 units once weekly      Other Diabetes Related Aspects    Lab Results   Component Value Date    [id number] 528 01/26/2021          Thyroid health        Lab Results   Component Value Date    TSH 1.290 03/28/2022                Follow Up   Return in about 2 weeks (around 12/20/2022) for Recheck.  Patient was given instructions and counseling regarding her condition or for health maintenance advice. Please see specific information pulled into the AVS if appropriate.

## 2022-12-29 ENCOUNTER — OFFICE VISIT (OUTPATIENT)
Dept: ENDOCRINOLOGY | Facility: CLINIC | Age: 47
End: 2022-12-29

## 2022-12-29 DIAGNOSIS — E11.65 TYPE 2 DIABETES MELLITUS WITH HYPERGLYCEMIA, WITH LONG-TERM CURRENT USE OF INSULIN: ICD-10-CM

## 2022-12-29 DIAGNOSIS — Z79.4 TYPE 2 DIABETES MELLITUS WITH HYPERGLYCEMIA, WITH LONG-TERM CURRENT USE OF INSULIN: ICD-10-CM

## 2022-12-29 PROCEDURE — 98960 EDU&TRN PT SELF-MGMT NQHP 1: CPT | Performed by: DIETITIAN, REGISTERED

## 2022-12-29 NOTE — PROGRESS NOTES
Carol Perez is a 47 y.o. female referred for outpatient diabetes education by Darshan AVILES. Patient attended individual education for one hour on 12/29/2022. Topics covered included:     1. Healthy Food Choices   i. Provided patient with detailed carbohydrate counting guide.    ii. Instructed patient to eat 45-60 grams of carbohydrate with each meal (3-4 exchange choices) and 15 grams of carb for snacks (1 exchange choices).   iii. Provided patient with list of non-starchy vegetables and foods that are low in carbohydrate for snacks and to incorporate with meals (Planning Healthy Meals Packet).    iv. Choose fruits, vegetables, whole grains, legumes, low-fat milk, fiber-rich foods, minimal saturated fats, and watch cholesterol and sodium intake.    v. Reviewed carbohydrate-containing foods, standard serving sizes, and measuring foods.   vi. Reviewed the difference between simple and complex carbohydrate. Encouraged patient to choose complex carbohydrates more often.   vii. Reviewed label reading. Discussed looking at serving size, total carbohydrates, fiber, saturated fat, sodium. Reviewed  amounts of each to hav per day & per meal.      2. Hyperglycemia/Hypoglycemia   i. Discussed signs, symptoms, and difference between hypo/hyperglycemia - and the proper treatment guidelines for both.    ii. Pt reports FBG >300; adjusted Tresiba to 34 units. Assisted pt in setting up InPen. Instructed pt on how to use shreya, ICR (1:4), Correction 2:50 over 200. Demonstrated how to use MetamarketsPal & CalorieKing apps to calculate carbs per meal.      3. Dexcom training    Reviewed components of Dexcom system. .      Explained low and high blood glucose alerts, as well as rise and fall rates. Instructed patient on how to view trend graph.     Reviewed how to enter sensor code into  with each sensor change. Patient did this in office.    Instructed patient to change sensor every 10 days to prevent skin  irritation or infection. Advised patient to see primary physician if signs of skin infection develop.     Instructed patient on how to properly insert sensor including: skin prep, site selection, site rotation, sensor placement, and how to use insertion device. Patient inserted sensor in office today.      Instructed patient on how to insert transmitter serial number into . Explained battery life of transmitter. Will need to change transmitter every 3 months. Discussed how to enter new serial number with each new transmitter.  will then pair with each new transmitter. Pairing done today.      Instructed patient to not wear sensor or transmitter in xray, MRI, or CT Scan.     Discussed signal loss alert and how to troubleshoot.      Advised patient to call Dexcom Customer Support if has a failed sensor or if has technical questions regarding CGM. Instructed patient on reordering sensor supplies.      Thank you Darshan AVILES for this referral.      DIANA Moreland, RD, LD

## 2023-01-17 ENCOUNTER — SPECIALTY PHARMACY (OUTPATIENT)
Dept: ENDOCRINOLOGY | Facility: CLINIC | Age: 48
End: 2023-01-17
Payer: COMMERCIAL

## 2023-01-17 ENCOUNTER — SPECIALTY PHARMACY (OUTPATIENT)
Dept: PHARMACY | Facility: TELEHEALTH | Age: 48
End: 2023-01-17

## 2023-01-17 NOTE — PROGRESS NOTES
Specialty Pharmacy Refill Coordination Note     Carol is a 47 y.o. female contacted today regarding refills of  humalog cartridge specialty medication(s).    Reviewed and verified with patient:       Specialty medication(s) and dose(s) confirmed: yes    Refill Questions    Flowsheet Row Most Recent Value   Changes to allergies? No   Changes to medications? No   New conditions since last clinic visit No   Unplanned office visit, urgent care, ED, or hospital admission in the last 4 weeks  No   How does patient/caregiver feel medication is working? Very good   Financial problems or insurance changes  No   Since the previous refill, were any specialty medication doses or scheduled injections missed or delayed?  No   Does this patient require a clinical escalation to a pharmacist? No          Delivery Questions    Flowsheet Row Most Recent Value   Delivery method  at Pharmacy                 Follow-up: 30 day(s)     Doretha Holt, Pharmacy Technician  Specialty Pharmacy Technician

## 2023-01-18 ENCOUNTER — TELEPHONE (OUTPATIENT)
Dept: ENDOCRINOLOGY | Facility: CLINIC | Age: 48
End: 2023-01-18
Payer: COMMERCIAL

## 2023-01-18 NOTE — TELEPHONE ENCOUNTER
Patient called and stated she is having an issue with her sensors. Requested call back.    Phone [phone]    Thank you

## 2023-01-19 ENCOUNTER — TELEPHONE (OUTPATIENT)
Dept: ENDOCRINOLOGY | Facility: CLINIC | Age: 48
End: 2023-01-19
Payer: COMMERCIAL

## 2023-02-08 ENCOUNTER — TELEPHONE (OUTPATIENT)
Dept: ENDOCRINOLOGY | Facility: CLINIC | Age: 48
End: 2023-02-08
Payer: COMMERCIAL

## 2023-02-08 NOTE — TELEPHONE ENCOUNTER
Left message requesting a call back       Called to see if she wanted to come in earlier due to cancellations

## 2023-02-09 ENCOUNTER — LAB (OUTPATIENT)
Dept: LAB | Facility: HOSPITAL | Age: 48
End: 2023-02-09
Payer: COMMERCIAL

## 2023-02-09 ENCOUNTER — OFFICE VISIT (OUTPATIENT)
Dept: ENDOCRINOLOGY | Facility: CLINIC | Age: 48
End: 2023-02-09
Payer: COMMERCIAL

## 2023-02-09 ENCOUNTER — TELEPHONE (OUTPATIENT)
Dept: ENDOCRINOLOGY | Facility: CLINIC | Age: 48
End: 2023-02-09

## 2023-02-09 VITALS
OXYGEN SATURATION: 98 % | BODY MASS INDEX: 26.83 KG/M2 | HEART RATE: 87 BPM | DIASTOLIC BLOOD PRESSURE: 80 MMHG | SYSTOLIC BLOOD PRESSURE: 110 MMHG | WEIGHT: 142.1 LBS | HEIGHT: 61 IN

## 2023-02-09 DIAGNOSIS — E11.65 TYPE 2 DIABETES MELLITUS WITH HYPERGLYCEMIA, WITH LONG-TERM CURRENT USE OF INSULIN: ICD-10-CM

## 2023-02-09 DIAGNOSIS — E78.2 MIXED HYPERLIPIDEMIA: ICD-10-CM

## 2023-02-09 DIAGNOSIS — E11.65 TYPE 2 DIABETES MELLITUS WITH HYPERGLYCEMIA, WITH LONG-TERM CURRENT USE OF INSULIN: Primary | ICD-10-CM

## 2023-02-09 DIAGNOSIS — Z79.4 TYPE 2 DIABETES MELLITUS WITH HYPERGLYCEMIA, WITH LONG-TERM CURRENT USE OF INSULIN: Primary | ICD-10-CM

## 2023-02-09 DIAGNOSIS — Z79.4 TYPE 2 DIABETES MELLITUS WITH HYPERGLYCEMIA, WITH LONG-TERM CURRENT USE OF INSULIN: ICD-10-CM

## 2023-02-09 DIAGNOSIS — E55.9 VITAMIN D DEFICIENCY: ICD-10-CM

## 2023-02-09 DIAGNOSIS — E11.42 DIABETIC POLYNEUROPATHY ASSOCIATED WITH TYPE 2 DIABETES MELLITUS: ICD-10-CM

## 2023-02-09 LAB
ALBUMIN SERPL-MCNC: 4.2 G/DL (ref 3.5–5.2)
ALBUMIN/GLOB SERPL: 1.4 G/DL
ALP SERPL-CCNC: 81 U/L (ref 39–117)
ALT SERPL W P-5'-P-CCNC: 20 U/L (ref 1–33)
ANION GAP SERPL CALCULATED.3IONS-SCNC: 8 MMOL/L (ref 5–15)
ANISOCYTOSIS BLD QL: NORMAL
AST SERPL-CCNC: 23 U/L (ref 1–32)
BASOPHILS # BLD AUTO: 0.07 10*3/MM3 (ref 0–0.2)
BASOPHILS NFR BLD AUTO: 0.8 % (ref 0–1.5)
BILIRUB SERPL-MCNC: <0.2 MG/DL (ref 0–1.2)
BUN SERPL-MCNC: 16 MG/DL (ref 6–20)
BUN/CREAT SERPL: 16.3 (ref 7–25)
CALCIUM SPEC-SCNC: 9.4 MG/DL (ref 8.6–10.5)
CHLORIDE SERPL-SCNC: 106 MMOL/L (ref 98–107)
CO2 SERPL-SCNC: 26 MMOL/L (ref 22–29)
CREAT SERPL-MCNC: 0.98 MG/DL (ref 0.57–1)
DEPRECATED RDW RBC AUTO: 43.8 FL (ref 37–54)
EGFRCR SERPLBLD CKD-EPI 2021: 72.2 ML/MIN/1.73
EOSINOPHIL # BLD AUTO: 0.09 10*3/MM3 (ref 0–0.4)
EOSINOPHIL NFR BLD AUTO: 1 % (ref 0.3–6.2)
ERYTHROCYTE [DISTWIDTH] IN BLOOD BY AUTOMATED COUNT: 17.4 % (ref 12.3–15.4)
GLOBULIN UR ELPH-MCNC: 2.9 GM/DL
GLUCOSE SERPL-MCNC: 108 MG/DL (ref 65–99)
HCT VFR BLD AUTO: 34.7 % (ref 34–46.6)
HGB BLD-MCNC: 10.3 G/DL (ref 12–15.9)
HYPOCHROMIA BLD QL: NORMAL
IMM GRANULOCYTES # BLD AUTO: 0.03 10*3/MM3 (ref 0–0.05)
IMM GRANULOCYTES NFR BLD AUTO: 0.3 % (ref 0–0.5)
LYMPHOCYTES # BLD AUTO: 1.55 10*3/MM3 (ref 0.7–3.1)
LYMPHOCYTES NFR BLD AUTO: 18 % (ref 19.6–45.3)
MCH RBC QN AUTO: 21.1 PG (ref 26.6–33)
MCHC RBC AUTO-ENTMCNC: 29.7 G/DL (ref 31.5–35.7)
MCV RBC AUTO: 71.1 FL (ref 79–97)
MONOCYTES # BLD AUTO: 0.69 10*3/MM3 (ref 0.1–0.9)
MONOCYTES NFR BLD AUTO: 8 % (ref 5–12)
NEUTROPHILS NFR BLD AUTO: 6.17 10*3/MM3 (ref 1.7–7)
NEUTROPHILS NFR BLD AUTO: 71.9 % (ref 42.7–76)
NRBC BLD AUTO-RTO: 0 /100 WBC (ref 0–0.2)
PLAT MORPH BLD: NORMAL
PLATELET # BLD AUTO: 431 10*3/MM3 (ref 140–450)
PMV BLD AUTO: 9.5 FL (ref 6–12)
POTASSIUM SERPL-SCNC: 3.9 MMOL/L (ref 3.5–5.2)
PROT SERPL-MCNC: 7.1 G/DL (ref 6–8.5)
RBC # BLD AUTO: 4.88 10*6/MM3 (ref 3.77–5.28)
SODIUM SERPL-SCNC: 140 MMOL/L (ref 136–145)
WBC MORPH BLD: NORMAL
WBC NRBC COR # BLD: 8.6 10*3/MM3 (ref 3.4–10.8)

## 2023-02-09 PROCEDURE — 36415 COLL VENOUS BLD VENIPUNCTURE: CPT

## 2023-02-09 PROCEDURE — 84443 ASSAY THYROID STIM HORMONE: CPT

## 2023-02-09 PROCEDURE — 99214 OFFICE O/P EST MOD 30 MIN: CPT | Performed by: NURSE PRACTITIONER

## 2023-02-09 PROCEDURE — 85007 BL SMEAR W/DIFF WBC COUNT: CPT

## 2023-02-09 PROCEDURE — 95251 CONT GLUC MNTR ANALYSIS I&R: CPT | Performed by: NURSE PRACTITIONER

## 2023-02-09 PROCEDURE — 83036 HEMOGLOBIN GLYCOSYLATED A1C: CPT

## 2023-02-09 PROCEDURE — 82306 VITAMIN D 25 HYDROXY: CPT | Performed by: NURSE PRACTITIONER

## 2023-02-09 PROCEDURE — 85025 COMPLETE CBC W/AUTO DIFF WBC: CPT

## 2023-02-09 PROCEDURE — 80053 COMPREHEN METABOLIC PANEL: CPT

## 2023-02-09 NOTE — PROGRESS NOTES
"Chief Complaint  Diabetes    Subjective          Carol Perez presents to [facility] for   Diabetes       In office visit     47 year old female presents for follow up      Reason diabetes mellitus type 2    Duration diagnosed in 2017    Timing constant    Quality not controlled     Severity - high due to hyperglycemia     States cannot remember any thing since she had COVID         Microvascular-- neuropathy         Context--presented with symptoms and lab work revealed she did have diabetes     She did have gestational diabetes            Diabetes Regimen--        insulin              Blood Glucose Readings     Checking 4 times daily     Using dexcom G6    See below            Review of Systems - General ROS: negative                    Objective   Vital Signs:   /80   Pulse 87   Ht 154.9 cm (61\")   Wt 64.5 kg (142 lb 1.6 oz)   SpO2 98%   BMI 26.85 kg/m²     Physical Exam  Constitutional:       Appearance: Normal appearance.   HENT:      Head: Normocephalic.      Right Ear: External ear normal.      Left Ear: External ear normal.   Eyes:      Conjunctiva/sclera: Conjunctivae normal.      Pupils: Pupils are equal, round, and reactive to light.   Cardiovascular:      Rate and Rhythm: Regular rhythm.      Heart sounds: Normal heart sounds.   Pulmonary:      Breath sounds: Normal breath sounds.   Musculoskeletal:         General: Normal range of motion.      Cervical back: Normal range of motion and neck supple.   Neurological:      Mental Status: She is alert.        Result Review :   The following data was reviewed by: TRACI Salazar on 01/28/2021:  CMP    CMP 3/28/22 6/3/22 8/3/22   Glucose 623 (A) 242 (A) 317 (A)   BUN 8 9 9   Creatinine 0.88 0.55 (A) 0.59   eGFR 82.7 114.6 112.7   Sodium 135 (A) 140 137   Potassium 4.3 3.8 3.6   Chloride 99 104 101   Calcium 9.2 10.1 8.6   Total Protein 6.8 7.2 6.8   Albumin 4.40 4.50 4.20   Globulin 2.4 " 2.7 2.6   Total Bilirubin 0.2 <0.2 0.2   Alkaline Phosphatase 98 98 87   AST (SGOT) 14 28 29   ALT (SGPT) 19 26 36 (A)   Albumin/Globulin Ratio 1.8 1.7 1.6   BUN/Creatinine Ratio 9.1 16.4 15.3   Anion Gap 14.7 15.0 15.0   (A) Abnormal value       Comments are available for some flowsheets but are not being displayed.           CBC    CBC 3/28/22 6/3/22 8/3/22   WBC 5.67 6.71 6.84   RBC 4.82 5.08 4.80   Hemoglobin 12.1 12.1 11.8 (A)   Hematocrit 40.0 37.7 35.5   MCV 83.0 74.2 (A) 74.0 (A)   MCH 25.1 (A) 23.8 (A) 24.6 (A)   MCHC 30.3 (A) 32.1 33.2   RDW 14.1 15.0 16.7 (A)   Platelets 341 327 294   (A) Abnormal value            Lipid Panel    Lipid Panel 3/28/22   Total Cholesterol 320 (A)   Triglycerides 341 (A)   HDL Cholesterol 44   VLDL Cholesterol 70 (A)   LDL Cholesterol  206 (A)   LDL/HDL Ratio 4.72   (A) Abnormal value            TSH    TSH 3/28/22   TSH 1.290           A1C Last 3 Results    HGBA1C Last 3 Results 3/28/22 6/3/22 11/7/22   Hemoglobin A1C 14.60 (A) 15.40 (A) 16.00 (A)   (A) Abnormal value                          Assessment and Plan    Problem List Items Addressed This Visit        Other    Type 2 diabetes mellitus with hyperglycemia, with long-term current use of insulin (HCC) - Primary    Relevant Orders    CBC & Differential    Comprehensive Metabolic Panel    Hemoglobin A1c    TSH    Vitamin D,25-Hydroxy    Diabetic polyneuropathy associated with type 2 diabetes mellitus (HCC)    Vitamin D deficiency    Relevant Orders    CBC & Differential    Comprehensive Metabolic Panel    Hemoglobin A1c    TSH    Vitamin D,25-Hydroxy    Mixed hyperlipidemia    Relevant Orders    CBC & Differential    Comprehensive Metabolic Panel    Hemoglobin A1c    TSH    Vitamin D,25-Hydroxy   Glycemic Management     Diabetes mellitus type 2         Lab Results   Component Value Date    HGBA1C 16.00 (H) 11/07/2022     She did get the INPEN               Ambulatory Glucose Profile Report    Days Analyzed : 2 week period  ending on 02/09/23      Continuous Glucose Monitory Device:  Dexcom G6     - 65% very high target range  - 25% high target range  - 10% in target range  - 0% below target range  - GMI 9.9 %  - Average glucose 276 mg/dl    Interpretation : Diabetes Type 2      hyperglycemia all the time adjust all insulin       Taking  Tresiba 34 units -- increase to 38 units      If you wake up with a blood glucose level less than 80 decrease by 4 units            Taking Humalog       Taking 15 up to 20 units --increase to 24 units         +       Sliding scale       Blood sugar  Insulin       200-250 2 units   251-300 4 units   301-350 6 units   Above 351 8 units    ---        Goals for sugar     Fasting and before meals 80 to 130     2 hours after meals 180 or less        Aim for 45 grams of carbohydrate per meal     Aim for 15 grams of carbohydrate per snack           Previous regimen      Metformin stopped due to side effects         Novolin 70/30 units -- taking 55 units at night -she is not taking the daytime dosage--- stop today and change to the following            approve dexcom G6             Lipid Management              Hyperlipidemia       Taking  Crestor 10 mg one night     Lab Results   Component Value Date     (H) 03/28/2022             Blood Pressure Management     Not on ACEi              Microvascular Complication Monitoring        Last eye exam July 2020 , no DR                 Neuropathy -  Bilateral feet       Taking Elavil 10 mg 1 at night           Bone Health            vitamin d def.             TAKING  50,000 units once weekly      Other Diabetes Related Aspects    Lab Results   Component Value Date    GDHKSHDB24 528 01/26/2021          Thyroid health        Lab Results   Component Value Date    TSH 1.290 03/28/2022                Follow Up   No follow-ups on file.  Patient was given instructions and counseling regarding her condition or for health maintenance advice. Please see specific  information pulled into the AVS if appropriate.

## 2023-02-10 ENCOUNTER — TELEPHONE (OUTPATIENT)
Dept: ENDOCRINOLOGY | Facility: CLINIC | Age: 48
End: 2023-02-10
Payer: COMMERCIAL

## 2023-02-10 LAB
25(OH)D3 SERPL-MCNC: 17.8 NG/ML (ref 30–100)
HBA1C MFR BLD: 11.1 % (ref 4.8–5.6)
TSH SERPL DL<=0.05 MIU/L-ACNC: 1.82 UIU/ML (ref 0.27–4.2)

## 2023-02-10 NOTE — TELEPHONE ENCOUNTER
----- Message from TRACI Salazar sent at 2/10/2023  8:04 AM CST -----  A1c is down to 11.1 improved from 16 so continue insulin as we discussed;, thyroid normal , kidney and liver functions normal, she is anemic her hgb was 10.3 and normal is 12, she needs to follow up with primary for reassessment

## 2023-02-15 ENCOUNTER — SPECIALTY PHARMACY (OUTPATIENT)
Dept: ENDOCRINOLOGY | Facility: CLINIC | Age: 48
End: 2023-02-15
Payer: COMMERCIAL

## 2023-02-15 ENCOUNTER — OFFICE VISIT (OUTPATIENT)
Dept: FAMILY MEDICINE CLINIC | Facility: CLINIC | Age: 48
End: 2023-02-15
Payer: COMMERCIAL

## 2023-02-15 ENCOUNTER — LAB (OUTPATIENT)
Dept: LAB | Facility: HOSPITAL | Age: 48
End: 2023-02-15
Payer: COMMERCIAL

## 2023-02-15 VITALS
BODY MASS INDEX: 26.24 KG/M2 | DIASTOLIC BLOOD PRESSURE: 82 MMHG | WEIGHT: 139 LBS | OXYGEN SATURATION: 99 % | HEIGHT: 61 IN | SYSTOLIC BLOOD PRESSURE: 122 MMHG | HEART RATE: 106 BPM | TEMPERATURE: 98 F

## 2023-02-15 DIAGNOSIS — D64.9 ANEMIA, UNSPECIFIED TYPE: Primary | ICD-10-CM

## 2023-02-15 DIAGNOSIS — Z12.31 SCREENING MAMMOGRAM FOR BREAST CANCER: ICD-10-CM

## 2023-02-15 PROCEDURE — 99213 OFFICE O/P EST LOW 20 MIN: CPT | Performed by: FAMILY MEDICINE

## 2023-02-15 PROCEDURE — 82728 ASSAY OF FERRITIN: CPT | Performed by: FAMILY MEDICINE

## 2023-02-15 PROCEDURE — 84466 ASSAY OF TRANSFERRIN: CPT | Performed by: FAMILY MEDICINE

## 2023-02-15 PROCEDURE — 83540 ASSAY OF IRON: CPT | Performed by: FAMILY MEDICINE

## 2023-02-15 PROCEDURE — 82607 VITAMIN B-12: CPT | Performed by: FAMILY MEDICINE

## 2023-02-15 PROCEDURE — 82746 ASSAY OF FOLIC ACID SERUM: CPT | Performed by: FAMILY MEDICINE

## 2023-02-15 PROCEDURE — 3046F HEMOGLOBIN A1C LEVEL >9.0%: CPT | Performed by: FAMILY MEDICINE

## 2023-02-15 NOTE — PROGRESS NOTES
" Subjective   Carol Perez is a 47 y.o. female.     Chief Complaint   Patient presents with   • Follow-up     Lab results             History of Present Illness     Anemic.  Hg 12.1 6/3/22, 11.8 8/3/22, 10.3 2/9/23   She eats meat, sometimes has heavy periods.   Poorly controlled diabetic but improving with ivana angel's help.  She has never had a mammogram    Review of Systems   Constitutional: Negative for chills, fatigue and fever.   HENT: Negative for congestion, ear discharge, ear pain, facial swelling, hearing loss, postnasal drip, rhinorrhea, sinus pressure, sore throat, trouble swallowing and voice change.    Eyes: Negative for discharge, redness and visual disturbance.   Respiratory: Negative for cough, chest tightness, shortness of breath and wheezing.    Cardiovascular: Negative for chest pain and palpitations.   Gastrointestinal: Negative for abdominal pain, blood in stool, constipation, diarrhea, nausea and vomiting.   Endocrine: Negative for polydipsia and polyuria.   Genitourinary: Negative for dysuria, flank pain, hematuria and urgency.   Musculoskeletal: Negative for arthralgias, back pain, joint swelling and myalgias.   Skin: Negative for rash.   Neurological: Negative for dizziness, weakness, numbness and headaches.   Hematological: Negative for adenopathy.   Psychiatric/Behavioral: Negative for confusion and sleep disturbance. The patient is not nervous/anxious.            /82 (BP Location: Left arm, Patient Position: Sitting, Cuff Size: Large Adult)   Pulse 106   Temp 98 °F (36.7 °C) (Infrared)   Ht 154.9 cm (61\")   Wt 63 kg (139 lb)   SpO2 99%   BMI 26.26 kg/m²       Objective     Physical Exam  Vitals and nursing note reviewed.   Constitutional:       Appearance: Normal appearance. She is well-developed.   HENT:      Head: Normocephalic and atraumatic.      Right Ear: External ear normal.      Left Ear: External ear normal.      Nose: Nose normal.   Eyes:      " Extraocular Movements: Extraocular movements intact.      Conjunctiva/sclera: Conjunctivae normal.      Pupils: Pupils are equal, round, and reactive to light.   Cardiovascular:      Rate and Rhythm: Normal rate and regular rhythm.      Heart sounds: Normal heart sounds.   Pulmonary:      Effort: Pulmonary effort is normal.   Musculoskeletal:         General: Normal range of motion.      Cervical back: Normal range of motion.   Neurological:      General: No focal deficit present.      Mental Status: She is alert and oriented to person, place, and time.   Psychiatric:         Behavior: Behavior normal.         Thought Content: Thought content normal.         Judgment: Judgment normal.             PAST MEDICAL HISTORY     Past Medical History:   Diagnosis Date   • Encounter for insertion of intrauterine contraceptive device      replace mirena      • History of female sterilization    • IUD check up    • Neuropathy    • Pain in pelvis    • PTSD (post-traumatic stress disorder)    • Restless leg syndrome    • Surgical follow-up care       PAST SURGICAL HISTORY     Past Surgical History:   Procedure Laterality Date   • HYSTEROSCOPY TUBAL STERILIZATION  10/22/2014    Essure tubal sterilization precedure.   • INJECTION OF MEDICATION  09/03/2014    Dep Provera   • PAP SMEAR  03/10/2008    neg   • VAGINAL DELIVERY  04/05/2008      SOCIAL HISTORY     Social History     Socioeconomic History   • Marital status:    Tobacco Use   • Smoking status: Never   • Smokeless tobacco: Never   Substance and Sexual Activity   • Alcohol use: No   • Drug use: No   • Sexual activity: Defer      ALLERGIES   Morphine   MEDICATIONS     Current Outpatient Medications   Medication Sig Dispense Refill   • amitriptyline (ELAVIL) 10 MG tablet Take 10 mg by mouth Every Night.     • Blood Glucose Monitoring Suppl (FreeStyle Lite) w/Device kit 4 TIMES DAILY     • Continuous Blood Gluc Sensor (Dexcom G6 Sensor) 1 each As Needed (glucose  "control). Every 10 days 9 each 3   • Continuous Blood Gluc Transmit (Dexcom G6 Transmitter) misc 1 each Every 3 (Three) Months. 1 each 3   • glucose blood (FREESTYLE TEST STRIPS) test strip 1 each by Other route 4 (Four) Times a Day. 200 each 12   • glucose blood test strip Test qid. SHE NEEDS BLOOD GLUCOSE METER AS WELL. E11.65 AND Z79.4 150 each 12   • Injection Device for Insulin (InPen 100-Blue-Samia-Humalog) device One inpen 1 each 0   • Insulin Degludec (TRESIBA FLEXTOUCH) 200 UNIT/ML solution pen-injector pen injection Inject 26 Units under the skin into the appropriate area as directed Every Night. 9 mL 11   • Insulin Lispro (HumaLOG) 100 UNIT/ML solution cartridge Up to 20 units with meals 9 mL 11   • Insulin Lispro 100 UNIT/ML solution cartridge Inject up to 14 Units under the skin into the appropriate area as directed 3 (Three) Times a Day With Meals. 15 mL 11   • Insulin Lispro, 1 Unit Dial, (HumaLOG KwikPen) 100 UNIT/ML solution pen-injector Inject 6 Units under the skin into the appropriate area as directed 3 (Three) Times a Day Before Meals. 9 mL 0   • Insulin Pen Needle (AboutTime Pen Needle) 32G X 4 MM misc Inject 4 times daily 120 each 11   • Insulin Syringe 30G X 5/16\" 1 ML misc 1 each 2 (Two) Times a Day. 100 each 11   • Lancets (FREESTYLE) lancets 1 each by Other route 2 (Two) Times a Day As Needed (sugar). 100 each 12   • pravastatin (Pravachol) 40 MG tablet Take 1 tablet by mouth Every Night. 90 tablet 1   • venlafaxine XR (Effexor XR) 37.5 MG 24 hr capsule Take 1 capsule by mouth Daily. TAKE IN THE MORNINGS. AFTER STARTING, STOP ZOLOFT 30 capsule 0   • vitamin D (ERGOCALCIFEROL) 1.25 MG (87538 UT) capsule capsule Take 1 capsule by mouth 1 (One) Time Per Week. 12 capsule 3     No current facility-administered medications for this visit.        The following portions of the patient's history were reviewed and updated as appropriate: allergies, current medications, past family history, past " medical history, past social history, past surgical history and problem list.        Assessment & Plan   Diagnoses and all orders for this visit:    1. Anemia, unspecified type (Primary)  -     Ferritin  -     Folate  -     Vitamin B12  -     Iron and TIBC    2. Screening mammogram for breast cancer  -     Mammo Screening Digital Tomosynthesis Bilateral With CAD      Stool fit test given                 No follow-ups on file.                  This document has been electronically signed by Kieran Valle MD on February 15, 2023 17:25 CST

## 2023-02-15 NOTE — PROGRESS NOTES
Specialty Pharmacy Refill Coordination Note     Carol is a 47 y.o. female contacted today regarding refills of  humalog specialty medication(s).    Reviewed and verified with patient:       Specialty medication(s) and dose(s) confirmed: yes    Refill Questions    Flowsheet Row Most Recent Value   Changes to allergies? No   Changes to medications? No   New conditions since last clinic visit No   Unplanned office visit, urgent care, ED, or hospital admission in the last 4 weeks  No   How does patient/caregiver feel medication is working? Very good   Financial problems or insurance changes  No   Since the previous refill, were any specialty medication doses or scheduled injections missed or delayed?  No   Does this patient require a clinical escalation to a pharmacist? No          Delivery Questions    Flowsheet Row Most Recent Value   Delivery method  at Pharmacy                 Follow-up: 30 day(s)     Doretha Holt, Pharmacy Technician  Specialty Pharmacy Technician      
[place]

## 2023-02-16 LAB
FERRITIN SERPL-MCNC: 9.38 NG/ML (ref 13–150)
FOLATE SERPL-MCNC: 17.6 NG/ML (ref 4.78–24.2)
IRON 24H UR-MRATE: 16 MCG/DL (ref 37–145)
IRON SATN MFR SERPL: 2 % (ref 20–50)
TIBC SERPL-MCNC: 665 MCG/DL (ref 298–536)
TRANSFERRIN SERPL-MCNC: 446 MG/DL (ref 200–360)
VIT B12 BLD-MCNC: 394 PG/ML (ref 211–946)

## 2023-02-17 RX ORDER — FERROUS SULFATE 325(65) MG
325 TABLET ORAL
Qty: 90 TABLET | Refills: 3 | Status: SHIPPED | OUTPATIENT
Start: 2023-02-17

## 2023-03-27 ENCOUNTER — SPECIALTY PHARMACY (OUTPATIENT)
Dept: ENDOCRINOLOGY | Facility: CLINIC | Age: 48
End: 2023-03-27
Payer: COMMERCIAL

## 2023-03-27 NOTE — PROGRESS NOTES
Specialty Pharmacy Refill Coordination Note     Carol is a 47 y.o. female contacted today regarding refills of  tresiba  specialty medication(s).    Reviewed and verified with patient:       Specialty medication(s) and dose(s) confirmed: yes    Refill Questions    Flowsheet Row Most Recent Value   Changes to allergies? No   Changes to medications? No   Financial problems or insurance changes  No   Since the previous refill, were any specialty medication doses or scheduled injections missed or delayed?  No   Does this patient require a clinical escalation to a pharmacist? No          Delivery Questions    Flowsheet Row Most Recent Value   Delivery method  at Pharmacy        Filling tresiba and [facility] for pharmacy         Follow-up: 30 day(s)     Doretha Holt, Pharmacy Technician  Specialty Pharmacy Technician

## 2023-04-11 ENCOUNTER — OFFICE VISIT (OUTPATIENT)
Dept: FAMILY MEDICINE CLINIC | Facility: CLINIC | Age: 48
End: 2023-04-11
Payer: COMMERCIAL

## 2023-04-11 VITALS
OXYGEN SATURATION: 99 % | BODY MASS INDEX: 27.09 KG/M2 | TEMPERATURE: 97.8 F | DIASTOLIC BLOOD PRESSURE: 79 MMHG | WEIGHT: 143.5 LBS | SYSTOLIC BLOOD PRESSURE: 127 MMHG | HEIGHT: 61 IN | HEART RATE: 113 BPM

## 2023-04-11 DIAGNOSIS — Z86.16 HISTORY OF 2019 NOVEL CORONAVIRUS DISEASE (COVID-19): ICD-10-CM

## 2023-04-11 DIAGNOSIS — R41.3 MEMORY PROBLEM: ICD-10-CM

## 2023-04-11 DIAGNOSIS — R06.02 SOB (SHORTNESS OF BREATH): Primary | ICD-10-CM

## 2023-04-11 PROCEDURE — 1159F MED LIST DOCD IN RCRD: CPT | Performed by: FAMILY MEDICINE

## 2023-04-11 PROCEDURE — 99213 OFFICE O/P EST LOW 20 MIN: CPT | Performed by: FAMILY MEDICINE

## 2023-04-11 PROCEDURE — 1160F RVW MEDS BY RX/DR IN RCRD: CPT | Performed by: FAMILY MEDICINE

## 2023-04-11 PROCEDURE — 3046F HEMOGLOBIN A1C LEVEL >9.0%: CPT | Performed by: FAMILY MEDICINE

## 2023-04-11 NOTE — PROGRESS NOTES
" Subjective   Carol Perez is a 47 y.o. female.     Chief Complaint   Patient presents with   • paperwork for disability             History of Present Illness     I had seen her 12/29/2021, she reported bad covid experience while living in [place]. She went into the hospital 10/27/21 and was intubated and spent a total of 19 days in the hospital.  She reported memory problems and sob.    She informs me now, that there was a workers comp case from [place] and they are ready to settle.  She has paperwork.  She continues to  Have memory problems and sob.    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     Review of Systems   Constitutional: Negative for chills, fatigue and fever.   HENT: Negative for congestion, ear discharge, ear pain, facial swelling, hearing loss, postnasal drip, rhinorrhea, sinus pressure, sore throat, trouble swallowing and voice change.    Eyes: Negative for discharge, redness and visual disturbance.   Respiratory: Negative for cough, chest tightness, shortness of breath and wheezing.    Cardiovascular: Negative for chest pain and palpitations.   Gastrointestinal: Negative for abdominal pain, blood in stool, constipation, diarrhea, nausea and vomiting.   Endocrine: Negative for polydipsia and polyuria.   Genitourinary: Negative for dysuria, flank pain, hematuria and urgency.   Musculoskeletal: Negative for arthralgias, back pain, joint swelling and myalgias.   Skin: Negative for rash.   Neurological: Negative for dizziness, weakness, numbness and headaches.   Hematological: Negative for adenopathy.   Psychiatric/Behavioral: Negative for confusion and sleep disturbance. The patient is not nervous/anxious.            /79 (BP Location: Left arm, Patient Position: Sitting, Cuff Size: Adult)   Pulse 113   Temp 97.8 °F (36.6 °C) (Infrared)   Ht 154.9 cm (60.98\")   Wt 65.1 kg (143 lb 8 oz)   SpO2 99%   BMI 27.13 kg/m²       Objective     Physical Exam  Vitals and " nursing note reviewed.   Constitutional:       Appearance: Normal appearance. She is well-developed.   HENT:      Head: Normocephalic and atraumatic.      Right Ear: External ear normal.      Left Ear: External ear normal.      Nose: Nose normal.   Eyes:      Extraocular Movements: Extraocular movements intact.      Conjunctiva/sclera: Conjunctivae normal.      Pupils: Pupils are equal, round, and reactive to light.   Pulmonary:      Effort: Pulmonary effort is normal.   Musculoskeletal:         General: Normal range of motion.      Cervical back: Normal range of motion.   Neurological:      General: No focal deficit present.      Mental Status: She is alert and oriented to person, place, and time.   Psychiatric:         Behavior: Behavior normal.         Thought Content: Thought content normal.         Judgment: Judgment normal.             PAST MEDICAL HISTORY     Past Medical History:   Diagnosis Date   • Encounter for insertion of intrauterine contraceptive device      replace mirena      • History of female sterilization    • IUD check up    • Neuropathy    • Pain in pelvis    • PTSD (post-traumatic stress disorder)    • Restless leg syndrome    • Surgical follow-up care       PAST SURGICAL HISTORY     Past Surgical History:   Procedure Laterality Date   • HYSTEROSCOPY TUBAL STERILIZATION  10/22/2014    Essure tubal sterilization precedure.   • INJECTION OF MEDICATION  09/03/2014    Dep Provera   • PAP SMEAR  03/10/2008    neg   • VAGINAL DELIVERY  04/05/2008      SOCIAL HISTORY     Social History     Socioeconomic History   • Marital status:    Tobacco Use   • Smoking status: Never   • Smokeless tobacco: Never   Substance and Sexual Activity   • Alcohol use: No   • Drug use: No   • Sexual activity: Defer      ALLERGIES   Morphine   MEDICATIONS     Current Outpatient Medications   Medication Sig Dispense Refill   • amitriptyline (ELAVIL) 10 MG tablet Take 1 tablet by mouth Every Night.     • Blood  "Glucose Monitoring Suppl (FreeStyle Lite) w/Device kit 4 TIMES DAILY     • Continuous Blood Gluc Sensor (Dexcom G6 Sensor) 1 each As Needed (glucose control). Every 10 days 9 each 3   • Continuous Blood Gluc Transmit (Dexcom G6 Transmitter) misc 1 each Every 3 (Three) Months. 1 each 3   • ferrous sulfate 325 (65 FE) MG tablet Take 1 tablet by mouth Daily With Breakfast. 90 tablet 3   • glucose blood (FREESTYLE TEST STRIPS) test strip 1 each by Other route 4 (Four) Times a Day. 200 each 12   • glucose blood test strip Test qid. SHE NEEDS BLOOD GLUCOSE METER AS WELL. E11.65 AND Z79.4 150 each 12   • Injection Device for Insulin (InPen 100-Blue-Samia-Humalog) device One inpen 1 each 0   • Insulin Degludec (TRESIBA FLEXTOUCH) 200 UNIT/ML solution pen-injector pen injection Inject 26 Units under the skin into the appropriate area as directed Every Night. 9 mL 11   • Insulin Lispro (HumaLOG) 100 UNIT/ML solution cartridge Up to 20 units with meals 9 mL 11   • Insulin Lispro 100 UNIT/ML solution cartridge Inject up to 14 Units under the skin into the appropriate area as directed 3 (Three) Times a Day With Meals. 15 mL 11   • Insulin Lispro, 1 Unit Dial, (HumaLOG KwikPen) 100 UNIT/ML solution pen-injector Inject 6 Units under the skin into the appropriate area as directed 3 (Three) Times a Day Before Meals. 9 mL 0   • Insulin Pen Needle (AboutTime Pen Needle) 32G X 4 MM misc Inject 4 times daily 120 each 11   • Insulin Syringe 30G X 5/16\" 1 ML misc 1 each 2 (Two) Times a Day. 100 each 11   • Lancets (FREESTYLE) lancets 1 each by Other route 2 (Two) Times a Day As Needed (sugar). 100 each 12   • sertraline (ZOLOFT) 50 MG tablet Take 1 tablet by mouth Daily.     • pravastatin (Pravachol) 40 MG tablet Take 1 tablet by mouth Every Night. (Patient not taking: Reported on 4/11/2023) 90 tablet 1   • venlafaxine XR (Effexor XR) 37.5 MG 24 hr capsule Take 1 capsule by mouth Daily. TAKE IN THE MORNINGS. AFTER STARTING, STOP ZOLOFT " (Patient not taking: Reported on 4/11/2023) 30 capsule 0   • vitamin D (ERGOCALCIFEROL) 1.25 MG (58669 UT) capsule capsule Take 1 capsule by mouth 1 (One) Time Per Week. (Patient not taking: Reported on 4/11/2023) 12 capsule 3     No current facility-administered medications for this visit.        The following portions of the patient's history were reviewed and updated as appropriate: allergies, current medications, past family history, past medical history, past social history, past surgical history and problem list.        Assessment & Plan   Diagnoses and all orders for this visit:    1. SOB (shortness of breath) (Primary)  -     Ambulatory Referral to Pulmonology    2. Memory problem  -     Ambulatory Referral to Neurology    3. History of 2019 novel coronavirus disease (COVID-19)    she has an  in [place].  I asked her to find out more, specifically if there is anything I can do?      I don't know how to quantify how much she has or has not improved?  She continues to have problems.      I told her if I had known it was a workers comp case, I would have gotten baseline evaluations by neurologist and pulmonologist.     All I can do now is have a neurologist confirm there is a deficit and it could be covid related and that she has some pulmonology abnormality that could be covid related?    20 minutes spent face to face discussion regarding above.                    No follow-ups on file.                  This document has been electronically signed by Kieran Valle MD on April 11, 2023 16:19 CDT

## 2023-04-13 DIAGNOSIS — R06.00 DYSPNEA, UNSPECIFIED TYPE: Primary | ICD-10-CM

## 2023-05-03 ENCOUNTER — SPECIALTY PHARMACY (OUTPATIENT)
Dept: ENDOCRINOLOGY | Facility: CLINIC | Age: 48
End: 2023-05-03
Payer: COMMERCIAL

## 2023-05-03 NOTE — PROGRESS NOTES
Specialty Pharmacy Refill Coordination Note     Carol is a 47 y.o. female contacted today regarding refills of  humalog  specialty medication(s).    Reviewed and verified with patient:       Specialty medication(s) and dose(s) confirmed: yes    Refill Questions    Flowsheet Row Most Recent Value   Changes to allergies? No   Changes to medications? No   Financial problems or insurance changes  No   Since the previous refill, were any specialty medication doses or scheduled injections missed or delayed?  No   Does this patient require a clinical escalation to a pharmacist? No          Delivery Questions    Flowsheet Row Most Recent Value   Delivery method  at Pharmacy                 Follow-up: 30 day(s)     Doretha Holt, Pharmacy Technician  Specialty Pharmacy Technician

## 2023-06-06 ENCOUNTER — SPECIALTY PHARMACY (OUTPATIENT)
Dept: ENDOCRINOLOGY | Facility: CLINIC | Age: 48
End: 2023-06-06
Payer: COMMERCIAL

## 2023-06-06 NOTE — PROGRESS NOTES
Specialty Pharmacy Refill Coordination Note     Carol is a 48 y.o. female contacted today regarding refills of  humalog specialty medication(s).    Reviewed and verified with patient:  Allergies       Specialty medication(s) and dose(s) confirmed: yes    Refill Questions      Flowsheet Row Most Recent Value   Changes to allergies? No   Changes to medications? No   New conditions since last clinic visit No   Unplanned office visit, urgent care, ED, or hospital admission in the last 4 weeks  No   How does patient/caregiver feel medication is working? Very good   Financial problems or insurance changes  No   Since the previous refill, were any specialty medication doses or scheduled injections missed or delayed?  No   Does this patient require a clinical escalation to a pharmacist? No            Delivery Questions      Flowsheet Row Most Recent Value   Delivery method  at Pharmacy   Copay form of payment Pay at pickup                   Follow-up: 2 month(s)     Ventura L. Rolando  Specialty Pharmacy Technician

## 2023-07-31 ENCOUNTER — SPECIALTY PHARMACY (OUTPATIENT)
Dept: ENDOCRINOLOGY | Facility: CLINIC | Age: 48
End: 2023-07-31
Payer: COMMERCIAL

## 2023-09-01 ENCOUNTER — SPECIALTY PHARMACY (OUTPATIENT)
Dept: ENDOCRINOLOGY | Facility: CLINIC | Age: 48
End: 2023-09-01
Payer: COMMERCIAL

## 2023-09-01 NOTE — PROGRESS NOTES
Specialty Pharmacy Refill Coordination Note     Carol is a 48 y.o. female contacted today regarding refills of  humalog and tresiba specialty medication(s).    Reviewed and verified with patient:       Specialty medication(s) and dose(s) confirmed: yes    Refill Questions      Flowsheet Row Most Recent Value   Changes to allergies? No   Changes to medications? No   New conditions since last clinic visit No   Unplanned office visit, urgent care, ED, or hospital admission in the last 4 weeks  No   How does patient/caregiver feel medication is working? Good   Financial problems or insurance changes  No   Since the previous refill, were any specialty medication doses or scheduled injections missed or delayed?  No   Does this patient require a clinical escalation to a pharmacist? No            Delivery Questions      Flowsheet Row Most Recent Value   Delivery method  at Pharmacy                   Follow-up: 1 month      Khadra Jimenez, Pharmacy Intern  Specialty Pharmacy Technician

## 2023-09-26 ENCOUNTER — SPECIALTY PHARMACY (OUTPATIENT)
Dept: ENDOCRINOLOGY | Facility: CLINIC | Age: 48
End: 2023-09-26
Payer: COMMERCIAL

## 2023-09-26 NOTE — PROGRESS NOTES
Specialty Pharmacy Refill Coordination Note     Carol is a 48 y.o. female contacted today regarding refills of  humalog specialty medication(s).    Reviewed and verified with patient:  Allergies       Specialty medication(s) and dose(s) confirmed: yes    Refill Questions      Flowsheet Row Most Recent Value   Changes to allergies? No   Changes to medications? No   New conditions since last clinic visit No   Unplanned office visit, urgent care, ED, or hospital admission in the last 4 weeks  No   How does patient/caregiver feel medication is working? Good   Financial problems or insurance changes  No   Since the previous refill, were any specialty medication doses or scheduled injections missed or delayed?  No   Does this patient require a clinical escalation to a pharmacist? No            Delivery Questions      Flowsheet Row Most Recent Value   Delivery method  at Pharmacy   Number of medications in delivery 1   Medication being filled and delivered humalog   Is there any medication that is due not being filled? No   Supplies needed? No supplies needed   Copay form of payment Pay at pickup   Questions or concerns for the pharmacist? No   Are any medications first time fills? No            No issues reported, picking up before Friday.        Follow-up: 3 months.      Ventura Marshall  Specialty Pharmacy Technician